# Patient Record
Sex: MALE | Race: BLACK OR AFRICAN AMERICAN | Employment: FULL TIME | URBAN - METROPOLITAN AREA
[De-identification: names, ages, dates, MRNs, and addresses within clinical notes are randomized per-mention and may not be internally consistent; named-entity substitution may affect disease eponyms.]

---

## 2017-11-22 ENCOUNTER — OFFICE VISIT (OUTPATIENT)
Dept: PRIMARY CARE CLINIC | Age: 37
End: 2017-11-22

## 2017-11-22 VITALS
DIASTOLIC BLOOD PRESSURE: 86 MMHG | SYSTOLIC BLOOD PRESSURE: 136 MMHG | WEIGHT: 220 LBS | TEMPERATURE: 98.4 F | BODY MASS INDEX: 35.36 KG/M2 | RESPIRATION RATE: 16 BRPM | HEART RATE: 74 BPM | OXYGEN SATURATION: 97 % | HEIGHT: 66 IN

## 2017-11-22 DIAGNOSIS — E66.09 CLASS 2 OBESITY DUE TO EXCESS CALORIES WITH BODY MASS INDEX (BMI) OF 35.0 TO 35.9 IN ADULT, UNSPECIFIED WHETHER SERIOUS COMORBIDITY PRESENT: ICD-10-CM

## 2017-11-22 DIAGNOSIS — G89.29 CHRONIC MIDLINE LOW BACK PAIN WITHOUT SCIATICA: ICD-10-CM

## 2017-11-22 DIAGNOSIS — Z82.3 FAMILY HISTORY OF STROKE: ICD-10-CM

## 2017-11-22 DIAGNOSIS — I10 ESSENTIAL HYPERTENSION: Primary | ICD-10-CM

## 2017-11-22 DIAGNOSIS — Z82.49 FAMILY HISTORY OF HYPERTENSION: ICD-10-CM

## 2017-11-22 DIAGNOSIS — M54.50 CHRONIC MIDLINE LOW BACK PAIN WITHOUT SCIATICA: ICD-10-CM

## 2017-11-22 PROCEDURE — 99204 OFFICE O/P NEW MOD 45 MIN: CPT | Performed by: FAMILY MEDICINE

## 2017-11-22 PROCEDURE — G8427 DOCREV CUR MEDS BY ELIG CLIN: HCPCS | Performed by: FAMILY MEDICINE

## 2017-11-22 PROCEDURE — G8482 FLU IMMUNIZE ORDER/ADMIN: HCPCS | Performed by: FAMILY MEDICINE

## 2017-11-22 PROCEDURE — G8417 CALC BMI ABV UP PARAM F/U: HCPCS | Performed by: FAMILY MEDICINE

## 2017-11-22 PROCEDURE — 4004F PT TOBACCO SCREEN RCVD TLK: CPT | Performed by: FAMILY MEDICINE

## 2017-11-22 RX ORDER — CYCLOBENZAPRINE HCL 10 MG
10 TABLET ORAL 3 TIMES DAILY PRN
Qty: 50 TABLET | Refills: 0 | Status: SHIPPED | OUTPATIENT
Start: 2017-11-22 | End: 2017-12-02

## 2017-11-22 RX ORDER — DICLOFENAC SODIUM 75 MG/1
75 TABLET, DELAYED RELEASE ORAL 2 TIMES DAILY
Qty: 60 TABLET | Refills: 1 | Status: SHIPPED | OUTPATIENT
Start: 2017-11-22 | End: 2017-12-13 | Stop reason: ALTCHOICE

## 2017-11-22 RX ORDER — AMLODIPINE BESYLATE 5 MG/1
5 TABLET ORAL EVERY MORNING
Qty: 30 TABLET | Refills: 3 | Status: SHIPPED | OUTPATIENT
Start: 2017-11-22 | End: 2017-12-13 | Stop reason: DRUGHIGH

## 2017-11-22 RX ORDER — PREDNISONE 10 MG/1
TABLET ORAL
Qty: 30 TABLET | Refills: 0 | Status: SHIPPED | OUTPATIENT
Start: 2017-11-22 | End: 2017-12-02

## 2017-11-22 ASSESSMENT — PATIENT HEALTH QUESTIONNAIRE - PHQ9
SUM OF ALL RESPONSES TO PHQ QUESTIONS 1-9: 0
1. LITTLE INTEREST OR PLEASURE IN DOING THINGS: 0
2. FEELING DOWN, DEPRESSED OR HOPELESS: 0
SUM OF ALL RESPONSES TO PHQ9 QUESTIONS 1 & 2: 0

## 2017-11-22 ASSESSMENT — ENCOUNTER SYMPTOMS
ABDOMINAL PAIN: 0
SHORTNESS OF BREATH: 0
BOWEL INCONTINENCE: 0
BLURRED VISION: 0
BACK PAIN: 1
ORTHOPNEA: 0

## 2017-11-22 NOTE — PROGRESS NOTES
States that his back flares up every 4 months or so. History reviewed. No pertinent past medical history. Past Surgical History:   Procedure Laterality Date    SMALL INTESTINE SURGERY      STOMACH SURGERY       Family History   Problem Relation Age of Onset    High Blood Pressure Mother     High Blood Pressure Father      Social History     Social History    Marital status:      Spouse name: N/A    Number of children: N/A    Years of education: N/A     Occupational History    Not on file. Social History Main Topics    Smoking status: Current Some Day Smoker     Years: 20.00    Smokeless tobacco: Never Used    Alcohol use Yes      Comment: ocasionally    Drug use: No    Sexual activity: Yes     Partners: Female     Other Topics Concern    Not on file     Social History Narrative    No narrative on file     Allergies:  Review of patient's allergies indicates no known allergies. Review of Systems   Constitutional: Negative for fever, malaise/fatigue and weight loss. Eyes: Negative for blurred vision. Respiratory: Negative for shortness of breath. Cardiovascular: Negative for chest pain, palpitations, orthopnea and PND. Gastrointestinal: Negative for abdominal pain and bowel incontinence. Genitourinary: Positive for pelvic pain (bilateral hips). Negative for bladder incontinence and dysuria. Musculoskeletal: Positive for back pain. Negative for neck pain. Neurological: Negative for tingling, weakness, numbness, headaches and paresthesias. Objective:   /86   Pulse 74   Temp 98.4 °F (36.9 °C) (Tympanic)   Resp 16   Ht 5' 6\" (1.676 m)   Wt 220 lb (99.8 kg)   SpO2 97%   BMI 35.51 kg/m²     Physical Exam   Musculoskeletal:        Lumbar back: He exhibits decreased range of motion, tenderness, pain and spasm. PHYSICAL EXAMINATION:   VITAL SIGNS: are as recorded. GENERAL:  The patient appears well nourished and well-developed, and with normal affect. No acute respiratory distress. Alert and oriented times 3. No skin rashes. HEENT:  TMs normal bilaterally. Canals and ears normal. Pharynx is clear. Extraocular eye motions intact and pain free. Pupils reactive and equally round. Sclerae and conjunctivae clear, normocephalic and atraumatic. RESPIRATORY:  Clear and equal breath sounds with no acute respiratory distress. HEART: Regular rhythm without murmur, rub or gallop. ABDOMEN:  soft, nontender. No masses, guarding or rebound. Normoactive bowel sounds. LOW BACK: tenderness to palpation of inferior lumbar spine or either sacroiliac joint area. Assessment:     1. Essential hypertension  amLODIPine (NORVASC) 5 MG tablet   2. Chronic midline low back pain without sciatica  diclofenac (VOLTAREN) 75 MG EC tablet    cyclobenzaprine (FLEXERIL) 10 MG tablet    predniSONE (DELTASONE) 10 MG tablet   3. Family history of hypertension  amLODIPine (NORVASC) 5 MG tablet   4. Family history of stroke  amLODIPine (NORVASC) 5 MG tablet   5. Class 2 obesity due to excess calories with body mass index (BMI) of 35.0 to 35.9 in adult, unspecified whether serious comorbidity present         Plan:      No orders of the defined types were placed in this encounter.     Orders Placed This Encounter   Medications    diclofenac (VOLTAREN) 75 MG EC tablet     Sig: Take 1 tablet by mouth 2 times daily     Dispense:  60 tablet     Refill:  1    cyclobenzaprine (FLEXERIL) 10 MG tablet     Sig: Take 1 tablet by mouth 3 times daily as needed for Muscle spasms     Dispense:  50 tablet     Refill:  0    predniSONE (DELTASONE) 10 MG tablet     Si per day for 3 days, 3 per day for 3 days, then 2 per day for 3 days, then1 per day for 3 days, then stop     Dispense:  30 tablet     Refill:  0    amLODIPine (NORVASC) 5 MG tablet     Sig: Take 1 tablet by mouth every morning     Dispense:  30 tablet     Refill:  3     PT & ice    Controlled Substances Monitoring:

## 2017-12-13 ENCOUNTER — OFFICE VISIT (OUTPATIENT)
Dept: PRIMARY CARE CLINIC | Age: 37
End: 2017-12-13

## 2017-12-13 VITALS
HEART RATE: 80 BPM | WEIGHT: 221 LBS | SYSTOLIC BLOOD PRESSURE: 150 MMHG | DIASTOLIC BLOOD PRESSURE: 96 MMHG | HEIGHT: 66 IN | TEMPERATURE: 97.4 F | RESPIRATION RATE: 12 BRPM | BODY MASS INDEX: 35.52 KG/M2

## 2017-12-13 DIAGNOSIS — I10 ESSENTIAL HYPERTENSION: Primary | ICD-10-CM

## 2017-12-13 DIAGNOSIS — G89.29 CHRONIC MIDLINE LOW BACK PAIN WITHOUT SCIATICA: ICD-10-CM

## 2017-12-13 DIAGNOSIS — E66.09 CLASS 2 OBESITY DUE TO EXCESS CALORIES WITH BODY MASS INDEX (BMI) OF 35.0 TO 35.9 IN ADULT, UNSPECIFIED WHETHER SERIOUS COMORBIDITY PRESENT: ICD-10-CM

## 2017-12-13 DIAGNOSIS — M54.50 CHRONIC MIDLINE LOW BACK PAIN WITHOUT SCIATICA: ICD-10-CM

## 2017-12-13 DIAGNOSIS — Z82.49 FAMILY HISTORY OF HYPERTENSION: ICD-10-CM

## 2017-12-13 DIAGNOSIS — Z82.3 FAMILY HISTORY OF STROKE: ICD-10-CM

## 2017-12-13 DIAGNOSIS — Z72.0 TOBACCO ABUSE: ICD-10-CM

## 2017-12-13 PROCEDURE — 4004F PT TOBACCO SCREEN RCVD TLK: CPT | Performed by: FAMILY MEDICINE

## 2017-12-13 PROCEDURE — G8482 FLU IMMUNIZE ORDER/ADMIN: HCPCS | Performed by: FAMILY MEDICINE

## 2017-12-13 PROCEDURE — 99214 OFFICE O/P EST MOD 30 MIN: CPT | Performed by: FAMILY MEDICINE

## 2017-12-13 PROCEDURE — G8427 DOCREV CUR MEDS BY ELIG CLIN: HCPCS | Performed by: FAMILY MEDICINE

## 2017-12-13 PROCEDURE — G8417 CALC BMI ABV UP PARAM F/U: HCPCS | Performed by: FAMILY MEDICINE

## 2017-12-13 RX ORDER — AMLODIPINE BESYLATE 10 MG/1
10 TABLET ORAL DAILY
Qty: 30 TABLET | Refills: 3 | Status: SHIPPED | OUTPATIENT
Start: 2017-12-13 | End: 2018-09-26 | Stop reason: SDUPTHER

## 2017-12-13 RX ORDER — BUPROPION HYDROCHLORIDE 150 MG/1
150 TABLET, EXTENDED RELEASE ORAL 2 TIMES DAILY
Qty: 60 TABLET | Refills: 3 | Status: SHIPPED | OUTPATIENT
Start: 2017-12-13

## 2017-12-13 RX ORDER — IBUPROFEN 800 MG/1
800 TABLET ORAL EVERY 8 HOURS PRN
Qty: 90 TABLET | Refills: 3 | Status: SHIPPED | OUTPATIENT
Start: 2017-12-13 | End: 2018-08-29

## 2017-12-13 ASSESSMENT — ENCOUNTER SYMPTOMS
BACK PAIN: 1
BOWEL INCONTINENCE: 0
ABDOMINAL PAIN: 0
ORTHOPNEA: 0
BLURRED VISION: 0
SHORTNESS OF BREATH: 0

## 2017-12-13 NOTE — PROGRESS NOTES
Subjective:      Patient ID: Zulema Gong is a 44 y.o. male who presents today for:  Chief Complaint   Patient presents with    Hypertension     Pt. is here for a f/u on hypertension. Pt. is currently taking Amlodipine which he states is working well for him. Pt. denies any associated symptoms.  Back Pain     Pt. is here for a f/u on back pain. Pt. states the pain is off and on. Pt. states the pain is sharp and stabbing. Pt. rates the pain as 6/10. Pt. has taken Ibuprofen 800 MG which took the pain away for awhile, also Diclofenac which he states didn't work when he needed it to. Hypertension   This is a chronic problem. The current episode started more than 1 year ago. The problem is uncontrolled. Pertinent negatives include no anxiety, blurred vision, chest pain, headaches, malaise/fatigue, neck pain, orthopnea, palpitations, peripheral edema, PND, shortness of breath or sweats. Risk factors for coronary artery disease include male gender. Treatments tried: amlodipine 5 MG. The current treatment provides mild improvement. Back Pain   This is a recurrent problem. The current episode started more than 1 month ago. The problem occurs intermittently. The pain is present in the lumbar spine. The quality of the pain is described as stabbing (sharp). Radiates to: bilateral hips. The pain is at a severity of 6/10. The pain is moderate. The symptoms are aggravated by position, bending and twisting. Pertinent negatives include no abdominal pain, bladder incontinence, bowel incontinence, chest pain, dysuria, fever, headaches, leg pain, numbness, paresis, paresthesias, pelvic pain, perianal numbness, tingling, weakness or weight loss. Treatments tried: ibuprofen, diclofenac, prednisone. The treatment provided mild relief. States that he feels like his back is 50% better. He was advised to d/c the diclofenac & take ibuprofen instead. No past medical history on file.   Past Surgical History:   Procedure Laterality Date    SMALL INTESTINE SURGERY      STOMACH SURGERY       Family History   Problem Relation Age of Onset    High Blood Pressure Mother     High Blood Pressure Father      Social History     Social History    Marital status:      Spouse name: N/A    Number of children: N/A    Years of education: N/A     Occupational History    Not on file. Social History Main Topics    Smoking status: Current Some Day Smoker     Years: 20.00    Smokeless tobacco: Never Used    Alcohol use Yes      Comment: ocasionally    Drug use: No    Sexual activity: Yes     Partners: Female     Other Topics Concern    Not on file     Social History Narrative    No narrative on file     Allergies:  Review of patient's allergies indicates no known allergies. Review of Systems   Constitutional: Negative for fever, malaise/fatigue and weight loss. Eyes: Negative for blurred vision. Respiratory: Negative for shortness of breath. Cardiovascular: Negative for chest pain, palpitations, orthopnea and PND. Gastrointestinal: Negative for abdominal pain and bowel incontinence. Genitourinary: Negative for bladder incontinence, dysuria and pelvic pain. Musculoskeletal: Positive for back pain. Negative for neck pain. Neurological: Negative for tingling, weakness, numbness, headaches and paresthesias. Objective:   BP (!) 150/96 (Site: Right Arm, Position: Sitting, Cuff Size: Large Adult)   Pulse 80   Temp 97.4 °F (36.3 °C) (Oral)   Resp 12   Ht 5' 6\" (1.676 m)   Wt 221 lb (100.2 kg)   BMI 35.67 kg/m²     Physical Exam      PHYSICAL EXAMINATION:   VITAL SIGNS: are as recorded. GENERAL:  The patient appears well nourished and well-developed, and with normal affect. No acute respiratory distress. Alert and oriented times 3. No skin rashes. HEENT:  TMs normal bilaterally. Canals and ears normal. Pharynx is clear. Extraocular eye motions intact and pain free.    Pupils reactive and equally round.  Sclerae and conjunctivae clear, normocephalic and atraumatic. RESPIRATORY:  Clear and equal breath sounds with no acute respiratory distress. HEART: Regular rhythm without murmur, rub or gallop. ABDOMEN:  soft, nontender. No masses, guarding or rebound. Normoactive bowel sounds. LOW BACK:  tenderness to palpation of inferior lumbar spine or either sacroiliac joint area. Assessment:     1. Essential hypertension  amLODIPine (NORVASC) 10 MG tablet   2. Chronic midline low back pain without sciatica  ibuprofen (ADVIL;MOTRIN) 800 MG tablet   3. Family history of hypertension     4. Family history of stroke     5. Class 2 obesity due to excess calories with body mass index (BMI) of 35.0 to 35.9 in adult, unspecified whether serious comorbidity present     6. Tobacco abuse  buPROPion (WELLBUTRIN SR) 150 MG extended release tablet       Plan:      No orders of the defined types were placed in this encounter. Orders Placed This Encounter   Medications    amLODIPine (NORVASC) 10 MG tablet     Sig: Take 1 tablet by mouth daily     Dispense:  30 tablet     Refill:  3    ibuprofen (ADVIL;MOTRIN) 800 MG tablet     Sig: Take 1 tablet by mouth every 8 hours as needed for Pain     Dispense:  90 tablet     Refill:  3    buPROPion (WELLBUTRIN SR) 150 MG extended release tablet     Sig: Take 1 tablet by mouth 2 times daily     Dispense:  60 tablet     Refill:  3     bp next    D/c smoking    bw next & u/a & EKG next    Controlled Substances Monitoring:      Return in about 3 weeks (around 1/3/2018). I, LUH Acosta  , am scribing for and in the presence of Rakan Burton MD. Electronically signed by :  Daren Acosta MD, personally performed the services described in this documentation, as scribed by . LUH Acosta   in my presence, and it is both accurate and complete.  Electronically signed by: Rakan Burton MD    12/14/17 6:31 AM    Hedy Lay Art Rae MD

## 2018-08-22 ENCOUNTER — OFFICE VISIT (OUTPATIENT)
Dept: PRIMARY CARE CLINIC | Age: 38
End: 2018-08-22
Payer: COMMERCIAL

## 2018-08-22 VITALS
SYSTOLIC BLOOD PRESSURE: 132 MMHG | HEART RATE: 76 BPM | WEIGHT: 218 LBS | DIASTOLIC BLOOD PRESSURE: 70 MMHG | RESPIRATION RATE: 14 BRPM | OXYGEN SATURATION: 98 % | BODY MASS INDEX: 35.03 KG/M2 | TEMPERATURE: 98 F | HEIGHT: 66 IN

## 2018-08-22 DIAGNOSIS — E66.09 CLASS 2 OBESITY DUE TO EXCESS CALORIES WITH BODY MASS INDEX (BMI) OF 35.0 TO 35.9 IN ADULT, UNSPECIFIED WHETHER SERIOUS COMORBIDITY PRESENT: ICD-10-CM

## 2018-08-22 DIAGNOSIS — G89.29 CHRONIC MIDLINE LOW BACK PAIN WITHOUT SCIATICA: ICD-10-CM

## 2018-08-22 DIAGNOSIS — M54.50 CHRONIC MIDLINE LOW BACK PAIN WITHOUT SCIATICA: ICD-10-CM

## 2018-08-22 DIAGNOSIS — I10 ESSENTIAL HYPERTENSION: Primary | ICD-10-CM

## 2018-08-22 PROCEDURE — 99214 OFFICE O/P EST MOD 30 MIN: CPT | Performed by: FAMILY MEDICINE

## 2018-08-22 PROCEDURE — G8427 DOCREV CUR MEDS BY ELIG CLIN: HCPCS | Performed by: FAMILY MEDICINE

## 2018-08-22 PROCEDURE — 4004F PT TOBACCO SCREEN RCVD TLK: CPT | Performed by: FAMILY MEDICINE

## 2018-08-22 PROCEDURE — G8417 CALC BMI ABV UP PARAM F/U: HCPCS | Performed by: FAMILY MEDICINE

## 2018-08-22 RX ORDER — HYDROCODONE BITARTRATE AND ACETAMINOPHEN 5; 325 MG/1; MG/1
1 TABLET ORAL EVERY 6 HOURS PRN
Qty: 28 TABLET | Refills: 0 | Status: SHIPPED | OUTPATIENT
Start: 2018-08-22 | End: 2018-08-29

## 2018-08-22 RX ORDER — PREDNISONE 10 MG/1
TABLET ORAL
Qty: 30 TABLET | Refills: 0 | Status: SHIPPED | OUTPATIENT
Start: 2018-08-22 | End: 2018-08-29 | Stop reason: SDUPTHER

## 2018-08-22 RX ORDER — DICLOFENAC SODIUM 75 MG/1
75 TABLET, DELAYED RELEASE ORAL 2 TIMES DAILY
Qty: 60 TABLET | Refills: 2 | Status: SHIPPED | OUTPATIENT
Start: 2018-08-22

## 2018-08-22 NOTE — PROGRESS NOTES
Taylor Cowan 43 y.o. male presents today with   Chief Complaint   Patient presents with    Back Pain     Pt. is here today for reoccuring LL back pain x4 days. He states the pain in a stabbing sharp pain 10/10 pain rate. Ibuprofen 800mg with little relief. HPI     Fu back     Chronic,worse    Fu htn ,wt    Chronic,stable    No past medical history on file. Patient Active Problem List    Diagnosis Date Noted    Essential hypertension 11/22/2017    Chronic midline low back pain without sciatica 11/22/2017    Family history of hypertension 11/22/2017    Family history of stroke 11/22/2017    Class 2 obesity due to excess calories with body mass index (BMI) of 35.0 to 35.9 in adult 11/22/2017     Past Surgical History:   Procedure Laterality Date    SMALL INTESTINE SURGERY      STOMACH SURGERY       Family History   Problem Relation Age of Onset    High Blood Pressure Mother     High Blood Pressure Father      Social History     Social History    Marital status:      Spouse name: N/A    Number of children: N/A    Years of education: N/A     Social History Main Topics    Smoking status: Current Some Day Smoker     Years: 20.00    Smokeless tobacco: Never Used    Alcohol use Yes      Comment: ocasionally    Drug use: No    Sexual activity: Yes     Partners: Female     Other Topics Concern    None     Social History Narrative    None     No Known Allergies    Review of Systems        Vitals:    08/22/18 1754   BP: 132/70   Site: Right Arm   Position: Sitting   Cuff Size: Large Adult   Pulse: 76   Resp: 14   Temp: 98 °F (36.7 °C)   TempSrc: Oral   SpO2: 98%   Weight: 218 lb (98.9 kg)   Height: 5' 6\" (1.676 m)       Physical Exam       PHYSICAL EXAMINATION:   VITAL SIGNS: are as recorded. GENERAL:  The patient appears well nourished and well-developed, and with normal affect. No acute respiratory distress. Alert and oriented times 3. No skin rashes. HEENT:  TMs normal bilaterally.

## 2018-08-29 ENCOUNTER — OFFICE VISIT (OUTPATIENT)
Dept: PRIMARY CARE CLINIC | Age: 38
End: 2018-08-29
Payer: COMMERCIAL

## 2018-08-29 VITALS
SYSTOLIC BLOOD PRESSURE: 132 MMHG | HEIGHT: 66 IN | WEIGHT: 214 LBS | RESPIRATION RATE: 14 BRPM | TEMPERATURE: 98.8 F | DIASTOLIC BLOOD PRESSURE: 82 MMHG | HEART RATE: 78 BPM | BODY MASS INDEX: 34.39 KG/M2 | OXYGEN SATURATION: 98 %

## 2018-08-29 DIAGNOSIS — G89.29 CHRONIC MIDLINE LOW BACK PAIN WITHOUT SCIATICA: ICD-10-CM

## 2018-08-29 DIAGNOSIS — M62.830 BACK SPASM: ICD-10-CM

## 2018-08-29 DIAGNOSIS — M54.50 CHRONIC MIDLINE LOW BACK PAIN WITHOUT SCIATICA: ICD-10-CM

## 2018-08-29 DIAGNOSIS — I10 ESSENTIAL HYPERTENSION: Primary | ICD-10-CM

## 2018-08-29 PROCEDURE — G8417 CALC BMI ABV UP PARAM F/U: HCPCS | Performed by: FAMILY MEDICINE

## 2018-08-29 PROCEDURE — 4004F PT TOBACCO SCREEN RCVD TLK: CPT | Performed by: FAMILY MEDICINE

## 2018-08-29 PROCEDURE — G8427 DOCREV CUR MEDS BY ELIG CLIN: HCPCS | Performed by: FAMILY MEDICINE

## 2018-08-29 PROCEDURE — 99214 OFFICE O/P EST MOD 30 MIN: CPT | Performed by: FAMILY MEDICINE

## 2018-08-29 RX ORDER — HYDROCODONE BITARTRATE AND ACETAMINOPHEN 5; 325 MG/1; MG/1
1 TABLET ORAL EVERY 6 HOURS PRN
Qty: 28 TABLET | Refills: 0 | Status: CANCELLED | OUTPATIENT
Start: 2018-08-29 | End: 2018-09-05

## 2018-08-29 RX ORDER — PREDNISONE 10 MG/1
TABLET ORAL
Qty: 30 TABLET | Refills: 0 | Status: SHIPPED | OUTPATIENT
Start: 2018-08-29 | End: 2018-09-12

## 2018-08-29 RX ORDER — GABAPENTIN 100 MG/1
CAPSULE ORAL
Qty: 60 CAPSULE | Refills: 0 | Status: SHIPPED | OUTPATIENT
Start: 2018-08-29 | End: 2018-09-12

## 2018-08-29 RX ORDER — OXYCODONE HYDROCHLORIDE AND ACETAMINOPHEN 5; 325 MG/1; MG/1
1 TABLET ORAL EVERY 6 HOURS PRN
Qty: 28 TABLET | Refills: 0 | Status: SHIPPED | OUTPATIENT
Start: 2018-08-29 | End: 2018-09-12 | Stop reason: SDUPTHER

## 2018-08-29 NOTE — PROGRESS NOTES
PPSV23) 03/28/1998    Potassium monitoring  07/21/2017    Creatinine monitoring  07/21/2017       Controlled Substances Monitoring: Attestation: The Prescription Monitoring Report for this patient was reviewed today. Ayde Kc MD)  Documentation: Possible medication side effects, risk of tolerance/dependence & alternative treatments discussed., Obtaining appropriate analgesic effect of treatment., No signs of potential drug abuse or diversion identified. Ayde Kc MD)    Return in about 2 weeks (around 9/12/2018).     Ayde Kc MD

## 2018-08-30 ASSESSMENT — ENCOUNTER SYMPTOMS
CHEST TIGHTNESS: 0
ABDOMINAL PAIN: 0
APNEA: 0
BACK PAIN: 1
ABDOMINAL DISTENTION: 0

## 2018-09-12 ENCOUNTER — OFFICE VISIT (OUTPATIENT)
Dept: PRIMARY CARE CLINIC | Age: 38
End: 2018-09-12
Payer: COMMERCIAL

## 2018-09-12 VITALS
DIASTOLIC BLOOD PRESSURE: 88 MMHG | SYSTOLIC BLOOD PRESSURE: 138 MMHG | HEART RATE: 74 BPM | WEIGHT: 218 LBS | TEMPERATURE: 99.2 F | RESPIRATION RATE: 14 BRPM | HEIGHT: 66 IN | BODY MASS INDEX: 35.03 KG/M2 | OXYGEN SATURATION: 94 %

## 2018-09-12 DIAGNOSIS — I10 ESSENTIAL HYPERTENSION: ICD-10-CM

## 2018-09-12 DIAGNOSIS — G89.29 CHRONIC MIDLINE LOW BACK PAIN WITHOUT SCIATICA: Primary | ICD-10-CM

## 2018-09-12 DIAGNOSIS — G89.29 CHRONIC MIDLINE LOW BACK PAIN WITHOUT SCIATICA: ICD-10-CM

## 2018-09-12 DIAGNOSIS — M54.2 NECK PAIN: ICD-10-CM

## 2018-09-12 DIAGNOSIS — M54.50 CHRONIC MIDLINE LOW BACK PAIN WITHOUT SCIATICA: Primary | ICD-10-CM

## 2018-09-12 DIAGNOSIS — M47.26 OSTEOARTHRITIS OF SPINE WITH RADICULOPATHY, LUMBAR REGION: ICD-10-CM

## 2018-09-12 DIAGNOSIS — M54.50 CHRONIC MIDLINE LOW BACK PAIN WITHOUT SCIATICA: ICD-10-CM

## 2018-09-12 DIAGNOSIS — M47.812 SPONDYLOSIS OF CERVICAL REGION WITHOUT MYELOPATHY OR RADICULOPATHY: ICD-10-CM

## 2018-09-12 PROCEDURE — G8417 CALC BMI ABV UP PARAM F/U: HCPCS | Performed by: FAMILY MEDICINE

## 2018-09-12 PROCEDURE — 4004F PT TOBACCO SCREEN RCVD TLK: CPT | Performed by: FAMILY MEDICINE

## 2018-09-12 PROCEDURE — 99214 OFFICE O/P EST MOD 30 MIN: CPT | Performed by: FAMILY MEDICINE

## 2018-09-12 PROCEDURE — G8427 DOCREV CUR MEDS BY ELIG CLIN: HCPCS | Performed by: FAMILY MEDICINE

## 2018-09-12 RX ORDER — PREDNISONE 10 MG/1
TABLET ORAL
Qty: 30 TABLET | Refills: 0 | Status: CANCELLED | OUTPATIENT
Start: 2018-09-12

## 2018-09-12 RX ORDER — OXYCODONE HYDROCHLORIDE AND ACETAMINOPHEN 5; 325 MG/1; MG/1
1 TABLET ORAL EVERY 6 HOURS PRN
Qty: 28 TABLET | Refills: 0 | Status: SHIPPED | OUTPATIENT
Start: 2018-09-12 | End: 2018-09-19

## 2018-09-12 ASSESSMENT — ENCOUNTER SYMPTOMS
BOWEL INCONTINENCE: 0
ABDOMINAL PAIN: 0
VISUAL CHANGE: 0
WHEEZING: 0
TROUBLE SWALLOWING: 0
PHOTOPHOBIA: 0
COUGH: 0
BACK PAIN: 1
SHORTNESS OF BREATH: 0

## 2018-09-12 ASSESSMENT — PATIENT HEALTH QUESTIONNAIRE - PHQ9
SUM OF ALL RESPONSES TO PHQ QUESTIONS 1-9: 0
2. FEELING DOWN, DEPRESSED OR HOPELESS: 0
SUM OF ALL RESPONSES TO PHQ9 QUESTIONS 1 & 2: 0
SUM OF ALL RESPONSES TO PHQ QUESTIONS 1-9: 0
1. LITTLE INTEREST OR PLEASURE IN DOING THINGS: 0

## 2018-09-12 NOTE — PROGRESS NOTES
Obtaining appropriate analgesic effect of treatment., No signs of potential drug abuse or diversion identified. Liu Persaud MD)    Return in about 2 weeks (around 9/26/2018). I, Cuauhtemoc ARVIZU, am scribing for and in the presence of Liu Persaud MD. Electronically signed by : Alma Rosa Keller MD, personally performed the services described in this documentation, as scribed by Cuauhtemoc ARVIZU   in my presence, and it is both accurate and complete.  Electronically signed by: Liu Persaud MD    9/17/18 6:08 AM    Liu Persaud MD

## 2018-09-15 LAB
6-ACETYLMORPHINE: NOT DETECTED
7-AMINOCLONAZEPAM: NOT DETECTED
ALPHA-OH-ALPRAZOLAM: NOT DETECTED
ALPRAZOLAM: NOT DETECTED
AMPHETAMINE: NOT DETECTED
BARBITURATES: NOT DETECTED
BENZOYLECGONINE: NOT DETECTED
BUPRENORPHINE: NOT DETECTED
CARISOPRODOL: NOT DETECTED
CLONAZEPAM: NOT DETECTED
CODEINE: NOT DETECTED
CREATININE URINE: 110.1 MG/DL (ref 20–400)
DIAZEPAM: NOT DETECTED
EER PAIN MGT DRUG PANEL, HIGH RES/EMIT U: NORMAL
ETHYL GLUCURONIDE: NOT DETECTED
FENTANYL: NOT DETECTED
HYDROCODONE: NOT DETECTED
HYDROMORPHONE: NOT DETECTED
LORAZEPAM: NOT DETECTED
MARIJUANA METABOLITE: PRESENT
MDA: NOT DETECTED
MDEA: NOT DETECTED
MDMA URINE: NOT DETECTED
MEPERIDINE: NOT DETECTED
METHADONE: NOT DETECTED
METHAMPHETAMINE: NOT DETECTED
METHYLPHENIDATE: NOT DETECTED
MIDAZOLAM: NOT DETECTED
MORPHINE: NOT DETECTED
NORBUPRENORPHINE, FREE: NOT DETECTED
NORDIAZEPAM: NOT DETECTED
NORFENTANYL: NOT DETECTED
NORHYDROCODONE, URINE: NOT DETECTED
NOROXYCODONE: NOT DETECTED
NOROXYMORPHONE, URINE: NOT DETECTED
OXAZEPAM: NOT DETECTED
OXYCODONE: NOT DETECTED
OXYMORPHONE: NOT DETECTED
PAIN MANAGEMENT DRUG PANEL: NORMAL
PCP: NOT DETECTED
PHENTERMINE: NOT DETECTED
PROPOXYPHENE: NOT DETECTED
TAPENTADOL, URINE: NOT DETECTED
TAPENTADOL-O-SULFATE, URINE: NOT DETECTED
TEMAZEPAM: NOT DETECTED
TRAMADOL: NOT DETECTED
ZOLPIDEM: NOT DETECTED

## 2018-09-26 ENCOUNTER — OFFICE VISIT (OUTPATIENT)
Dept: PRIMARY CARE CLINIC | Age: 38
End: 2018-09-26
Payer: COMMERCIAL

## 2018-09-26 VITALS
HEART RATE: 72 BPM | TEMPERATURE: 97.9 F | RESPIRATION RATE: 14 BRPM | OXYGEN SATURATION: 97 % | WEIGHT: 220.5 LBS | HEIGHT: 66 IN | DIASTOLIC BLOOD PRESSURE: 98 MMHG | SYSTOLIC BLOOD PRESSURE: 142 MMHG | BODY MASS INDEX: 35.44 KG/M2

## 2018-09-26 DIAGNOSIS — Z72.0 TOBACCO ABUSE: ICD-10-CM

## 2018-09-26 DIAGNOSIS — G89.29 CHRONIC MIDLINE LOW BACK PAIN WITHOUT SCIATICA: ICD-10-CM

## 2018-09-26 DIAGNOSIS — M54.50 CHRONIC MIDLINE LOW BACK PAIN WITHOUT SCIATICA: ICD-10-CM

## 2018-09-26 DIAGNOSIS — M47.16 OSTEOARTHRITIS OF LUMBAR SPINE WITH MYELOPATHY: ICD-10-CM

## 2018-09-26 DIAGNOSIS — I10 ESSENTIAL HYPERTENSION: Primary | ICD-10-CM

## 2018-09-26 PROCEDURE — G8417 CALC BMI ABV UP PARAM F/U: HCPCS | Performed by: FAMILY MEDICINE

## 2018-09-26 PROCEDURE — 4004F PT TOBACCO SCREEN RCVD TLK: CPT | Performed by: FAMILY MEDICINE

## 2018-09-26 PROCEDURE — 99214 OFFICE O/P EST MOD 30 MIN: CPT | Performed by: FAMILY MEDICINE

## 2018-09-26 PROCEDURE — G8427 DOCREV CUR MEDS BY ELIG CLIN: HCPCS | Performed by: FAMILY MEDICINE

## 2018-09-26 RX ORDER — PREDNISONE 10 MG/1
TABLET ORAL
Qty: 30 TABLET | Refills: 0 | Status: SHIPPED | OUTPATIENT
Start: 2018-09-26

## 2018-09-26 RX ORDER — AMLODIPINE BESYLATE 5 MG/1
5 TABLET ORAL DAILY
Qty: 30 TABLET | Refills: 2 | Status: SHIPPED | OUTPATIENT
Start: 2018-09-26

## 2018-09-26 ASSESSMENT — ENCOUNTER SYMPTOMS
EYE DISCHARGE: 0
VOMITING: 0
APNEA: 0
ABDOMINAL PAIN: 0
GASTROINTESTINAL NEGATIVE: 1
BLOOD IN STOOL: 0
PHOTOPHOBIA: 0
CONSTIPATION: 0
NAUSEA: 0
BACK PAIN: 1
CHEST TIGHTNESS: 0
SHORTNESS OF BREATH: 0
RESPIRATORY NEGATIVE: 1
EYES NEGATIVE: 1
COUGH: 0
DIARRHEA: 0

## 2018-09-26 NOTE — PROGRESS NOTES
Subjective:      Patient ID: Taylor Cowan is a 43 y.o. male who presents today for:  Chief Complaint   Patient presents with    Back Pain     Pt is here  for 2 week follow up on neck and back pain. he was taking Percocet for pain with icing. The pain is still bad, turning gives shooting pain down his left leg and at times his feet gives out and he collapses on the floor. Neck hurts more after sleeping, back is good upon the morning wake up. Work is difficult , the percocet does help. Back Pain   This is a recurrent problem. The current episode started more than 1 month ago. The problem has been gradually improving since onset. The quality of the pain is described as shooting and stabbing. The pain radiates to the left thigh and right thigh. The pain is moderate. The symptoms are aggravated by twisting. Pertinent negatives include no abdominal pain, chest pain, dysuria, fever, headaches or weakness. Treatments tried: Percocet with icing and back exercising. The treatment provided mild relief. Patient states he does a lot of lifting during work. Patient admits that he is cutting down on smoking. FU htn,tob abuse      Chronic,stable    No past medical history on file. Past Surgical History:   Procedure Laterality Date    SMALL INTESTINE SURGERY      STOMACH SURGERY       Family History   Problem Relation Age of Onset    High Blood Pressure Mother     High Blood Pressure Father      Social History     Social History    Marital status:      Spouse name: N/A    Number of children: N/A    Years of education: N/A     Occupational History    Not on file.      Social History Main Topics    Smoking status: Current Some Day Smoker     Years: 20.00    Smokeless tobacco: Never Used    Alcohol use Yes      Comment: ocasionally    Drug use: No    Sexual activity: Yes     Partners: Female     Other Topics Concern    Not on file     Social History Narrative    No narrative on file spine or either sacroiliac joint area. Assessment:      Diagnosis Orders   1. Essential hypertension  amLODIPine (NORVASC) 5 MG tablet   2. Osteoarthritis of lumbar spine with myelopathy  predniSONE (DELTASONE) 10 MG tablet   3. Tobacco abuse     4. Chronic midline low back pain without sciatica  predniSONE (DELTASONE) 10 MG tablet       Plan:      No orders of the defined types were placed in this encounter. Orders Placed This Encounter   Medications    amLODIPine (NORVASC) 5 MG tablet     Sig: Take 1 tablet by mouth daily     Dispense:  30 tablet     Refill:  2    predniSONE (DELTASONE) 10 MG tablet     Sig: Take 4 daily x 3 days, Take 3 tabs daily x 3 days, then 2 tabs daily x 3  days, then 1 tab daily x 3 days     Dispense:  30 tablet     Refill:  0     Drug screen next    D/C smoking    D/c marijuana    Controlled Substances Monitoring:      Return in about 3 weeks (around 10/17/2018). I, LUH Castillo, am scribing for and in the presence of Lora Pitts MD. Electronically signed by :  Jamaica Castillo MD, personally performed the services described in this documentation, as scribed by Joao Rodriguez in my presence, and it is both accurate and complete.  Electronically signed by: Lora Pitts MD    9/26/18 5:36 PM    Lora Pitts MD

## 2019-04-19 ENCOUNTER — TELEPHONE (OUTPATIENT)
Dept: ADMINISTRATIVE | Age: 39
End: 2019-04-19

## 2020-10-29 ENCOUNTER — APPOINTMENT (OUTPATIENT)
Dept: GENERAL RADIOLOGY | Age: 40
End: 2020-10-29
Payer: COMMERCIAL

## 2020-10-29 ENCOUNTER — HOSPITAL ENCOUNTER (EMERGENCY)
Age: 40
Discharge: HOME | End: 2020-10-29
Attending: EMERGENCY MEDICINE
Payer: COMMERCIAL

## 2020-10-29 VITALS
OXYGEN SATURATION: 99 % | HEIGHT: 66 IN | SYSTOLIC BLOOD PRESSURE: 137 MMHG | TEMPERATURE: 97.3 F | WEIGHT: 226 LBS | RESPIRATION RATE: 18 BRPM | BODY MASS INDEX: 36.32 KG/M2 | HEART RATE: 75 BPM | DIASTOLIC BLOOD PRESSURE: 82 MMHG

## 2020-10-29 LAB
ALBUMIN SERPL-MCNC: 4.7 G/DL (ref 3.5–4.6)
ALP BLD-CCNC: 97 U/L (ref 35–104)
ALT SERPL-CCNC: 48 U/L (ref 0–41)
ANION GAP SERPL CALCULATED.3IONS-SCNC: 9 MEQ/L (ref 9–15)
AST SERPL-CCNC: 29 U/L (ref 0–40)
BASOPHILS ABSOLUTE: 0.1 K/UL (ref 0–0.2)
BASOPHILS RELATIVE PERCENT: 1 %
BILIRUB SERPL-MCNC: 0.9 MG/DL (ref 0.2–0.7)
BUN BLDV-MCNC: 10 MG/DL (ref 6–20)
CALCIUM SERPL-MCNC: 10 MG/DL (ref 8.5–9.9)
CHLORIDE BLD-SCNC: 101 MEQ/L (ref 95–107)
CO2: 30 MEQ/L (ref 20–31)
CREAT SERPL-MCNC: 0.89 MG/DL (ref 0.7–1.2)
EKG ATRIAL RATE: 68 BPM
EKG P AXIS: 38 DEGREES
EKG P-R INTERVAL: 188 MS
EKG Q-T INTERVAL: 374 MS
EKG QRS DURATION: 94 MS
EKG QTC CALCULATION (BAZETT): 397 MS
EKG R AXIS: 32 DEGREES
EKG T AXIS: 2 DEGREES
EKG VENTRICULAR RATE: 68 BPM
EOSINOPHILS ABSOLUTE: 0.2 K/UL (ref 0–0.7)
EOSINOPHILS RELATIVE PERCENT: 2.7 %
GFR AFRICAN AMERICAN: >60
GFR NON-AFRICAN AMERICAN: >60
GLOBULIN: 2.4 G/DL (ref 2.3–3.5)
GLUCOSE BLD-MCNC: 96 MG/DL (ref 70–99)
HCT VFR BLD CALC: 50.6 % (ref 42–52)
HEMOGLOBIN: 16.7 G/DL (ref 14–18)
LYMPHOCYTES ABSOLUTE: 2.8 K/UL (ref 1–4.8)
LYMPHOCYTES RELATIVE PERCENT: 34.1 %
MAGNESIUM: 2.2 MG/DL (ref 1.7–2.4)
MCH RBC QN AUTO: 30.2 PG (ref 27–31.3)
MCHC RBC AUTO-ENTMCNC: 33 % (ref 33–37)
MCV RBC AUTO: 91.3 FL (ref 80–100)
MONOCYTES ABSOLUTE: 0.9 K/UL (ref 0.2–0.8)
MONOCYTES RELATIVE PERCENT: 11.3 %
NEUTROPHILS ABSOLUTE: 4.2 K/UL (ref 1.4–6.5)
NEUTROPHILS RELATIVE PERCENT: 50.9 %
PDW BLD-RTO: 14.3 % (ref 11.5–14.5)
PLATELET # BLD: 257 K/UL (ref 130–400)
POTASSIUM SERPL-SCNC: 4.5 MEQ/L (ref 3.4–4.9)
RBC # BLD: 5.54 M/UL (ref 4.7–6.1)
SODIUM BLD-SCNC: 140 MEQ/L (ref 135–144)
TOTAL PROTEIN: 7.1 G/DL (ref 6.3–8)
TROPONIN: <0.01 NG/ML (ref 0–0.01)
WBC # BLD: 8.3 K/UL (ref 4.8–10.8)

## 2020-10-29 PROCEDURE — 84484 ASSAY OF TROPONIN QUANT: CPT

## 2020-10-29 PROCEDURE — 96374 THER/PROPH/DIAG INJ IV PUSH: CPT

## 2020-10-29 PROCEDURE — 93005 ELECTROCARDIOGRAM TRACING: CPT | Performed by: EMERGENCY MEDICINE

## 2020-10-29 PROCEDURE — 36415 COLL VENOUS BLD VENIPUNCTURE: CPT

## 2020-10-29 PROCEDURE — 80053 COMPREHEN METABOLIC PANEL: CPT

## 2020-10-29 PROCEDURE — 83735 ASSAY OF MAGNESIUM: CPT

## 2020-10-29 PROCEDURE — 93010 ELECTROCARDIOGRAM REPORT: CPT | Performed by: INTERNAL MEDICINE

## 2020-10-29 PROCEDURE — 6360000002 HC RX W HCPCS: Performed by: EMERGENCY MEDICINE

## 2020-10-29 PROCEDURE — 71045 X-RAY EXAM CHEST 1 VIEW: CPT

## 2020-10-29 PROCEDURE — 99285 EMERGENCY DEPT VISIT HI MDM: CPT

## 2020-10-29 PROCEDURE — 85025 COMPLETE CBC W/AUTO DIFF WBC: CPT

## 2020-10-29 PROCEDURE — 2580000003 HC RX 258: Performed by: EMERGENCY MEDICINE

## 2020-10-29 RX ORDER — 0.9 % SODIUM CHLORIDE 0.9 %
1000 INTRAVENOUS SOLUTION INTRAVENOUS ONCE
Status: COMPLETED | OUTPATIENT
Start: 2020-10-29 | End: 2020-10-29

## 2020-10-29 RX ORDER — KETOROLAC TROMETHAMINE 30 MG/ML
30 INJECTION, SOLUTION INTRAMUSCULAR; INTRAVENOUS ONCE
Status: DISCONTINUED | OUTPATIENT
Start: 2020-10-29 | End: 2020-10-29

## 2020-10-29 RX ORDER — MORPHINE SULFATE 2 MG/ML
4 INJECTION, SOLUTION INTRAMUSCULAR; INTRAVENOUS
Status: DISCONTINUED | OUTPATIENT
Start: 2020-10-29 | End: 2020-10-29 | Stop reason: HOSPADM

## 2020-10-29 RX ORDER — TRAMADOL HYDROCHLORIDE 50 MG/1
50 TABLET ORAL EVERY 4 HOURS PRN
Qty: 18 TABLET | Refills: 0 | Status: SHIPPED | OUTPATIENT
Start: 2020-10-29 | End: 2020-11-01

## 2020-10-29 RX ORDER — OXYCODONE HYDROCHLORIDE AND ACETAMINOPHEN 5; 325 MG/1; MG/1
1 TABLET ORAL ONCE
Status: DISCONTINUED | OUTPATIENT
Start: 2020-10-29 | End: 2020-10-29

## 2020-10-29 RX ORDER — MELOXICAM 15 MG/1
15 TABLET ORAL DAILY PRN
Qty: 90 TABLET | Refills: 1 | Status: SHIPPED | OUTPATIENT
Start: 2020-10-29

## 2020-10-29 RX ORDER — ONDANSETRON 2 MG/ML
4 INJECTION INTRAMUSCULAR; INTRAVENOUS ONCE
Status: COMPLETED | OUTPATIENT
Start: 2020-10-29 | End: 2020-10-29

## 2020-10-29 RX ADMIN — ONDANSETRON 4 MG: 2 INJECTION INTRAMUSCULAR; INTRAVENOUS at 14:29

## 2020-10-29 RX ADMIN — SODIUM CHLORIDE 1000 ML: 9 INJECTION, SOLUTION INTRAVENOUS at 14:29

## 2020-10-29 RX ADMIN — MORPHINE SULFATE 4 MG: 2 INJECTION, SOLUTION INTRAMUSCULAR; INTRAVENOUS at 14:29

## 2020-10-29 ASSESSMENT — ENCOUNTER SYMPTOMS
ABDOMINAL PAIN: 0
COUGH: 0
SHORTNESS OF BREATH: 0
BACK PAIN: 0
NAUSEA: 0
VOMITING: 0
SORE THROAT: 0
DIARRHEA: 0

## 2020-10-29 ASSESSMENT — PAIN DESCRIPTION - LOCATION
LOCATION: ABDOMEN
LOCATION: CHEST

## 2020-10-29 ASSESSMENT — PAIN SCALES - GENERAL
PAINLEVEL_OUTOF10: 10

## 2020-10-29 ASSESSMENT — PAIN DESCRIPTION - PAIN TYPE: TYPE: ACUTE PAIN

## 2020-10-29 NOTE — ED PROVIDER NOTES
[address] [facility] ED  eMERGENCYdEPARTMENT eNCOUnter      Pt Name: Sharif Hoover  MRN: [id number]  [place] 1980  Date of evaluation: 10/29/2020  Orlando Noonan MD    CHIEF COMPLAINT           HPI  Sharif Hoover is a 38 y.o. male per chart review has no pmh presents to the ED with chest pain. Pt notes on Saturday he was fishing and his pole was about to fall and he dove out and landed on his L chest.  Pt notes moderate, constant, aching, nonradiating, L chest pain since the fall. Pt denies fever, n/v, dizziness, sob, diaphoresis, ab pain, dysuria, diarrhea. Pt states that touching his chest makes it worse. ROS  Review of Systems   Constitutional: Negative for activity change, chills and fever. HENT: Negative for ear pain and sore throat. Eyes: Negative for visual disturbance. Respiratory: Negative for cough and shortness of breath. Cardiovascular: Positive for chest pain. Negative for palpitations and leg swelling. Gastrointestinal: Negative for abdominal pain, diarrhea, nausea and vomiting. Genitourinary: Negative for dysuria. Musculoskeletal: Negative for back pain. Skin: Negative for rash. Neurological: Negative for dizziness and weakness. Except as noted above the remainder of the review of systems was reviewed and negative. PAST MEDICAL HISTORY   History reviewed. No pertinent past medical history.       SURGICAL HISTORY       Past Surgical History:   Procedure Laterality Date    SMALL INTESTINE SURGERY      STOMACH SURGERY           CURRENTMEDICATIONS       Previous Medications    AMLODIPINE (NORVASC) 5 MG TABLET    Take 1 tablet by mouth daily    BUPROPION (WELLBUTRIN SR) 150 MG EXTENDED RELEASE TABLET    Take 1 tablet by mouth 2 times daily    DICLOFENAC (VOLTAREN) 75 MG EC TABLET    Take 1 tablet by mouth 2 times daily    PREDNISONE (DELTASONE) 10 MG TABLET    Take 4 daily x 3 days, Take 3 tabs daily x 3 days, then 2 tabs daily x 3  days, then 1 tab Pupils are equal, round, and reactive to light. Neck:      Musculoskeletal: Normal range of motion and neck supple. Cardiovascular:      Rate and Rhythm: Normal rate and regular rhythm. Heart sounds: Normal heart sounds. Pulmonary:      Effort: Pulmonary effort is normal.      Breath sounds: Normal breath sounds. Comments: +Tenderness to palpation over L side of chest.  +Swelling. Palpation reproduces pain. Abdominal:      General: Bowel sounds are normal. There is no distension. Palpations: Abdomen is soft. Tenderness: There is no abdominal tenderness. Musculoskeletal: Normal range of motion. Skin:     General: Skin is warm and dry. Neurological:      Mental Status: He is alert and oriented to person, place, and time. Psychiatric:         Mood and Affect: Mood normal.           MDM  37 yo male presents to the ED with chest pain s/p fall. Pt is afebrile, hemodynamically stable. Pt given 1 L NS, IV morphine, IV zofran, IV toradol with moderate relief. EKG shows NSR with HR 68, normal axis, normal intervals, diffuse ST elevation in anterior and lateral leads. Given history of pt diving and hitting his chest, pt with inconsistent history for MI. Labs including troponin negative. Given constant chest pain since Saturday and negative troponin, unlikely ACS. Case discussed with Dr. Gucci Chávez and we both believed pt was stable to go home and f/u outpatient. Pt educated about chest pain. Suspect ST elevation is due to pericarditis vs benign early repol. Pt given thorough chest pain warning signs and will return if pain is worse. Pt given cp warning signs and will f/u with pcp. Pt understands plan. FINAL IMPRESSION      1.  Chest pain, unspecified type          DISPOSITION/PLAN   DISPOSITION Decision To Discharge 10/29/2020 03:03:00 PM        DISCHARGE MEDICATIONS:  [unfilled]         Arleen Aguirre MD(electronically signed)  Attending Emergency Physician Fermín Chapin MD  10/29/20 [id number]

## 2020-10-29 NOTE — ED TRIAGE NOTES
Pt to ER with c/o chest pain since sat or sun, pt states he was fishing and his pole starting to go in the water so he dove onto concrete to save his fishing pole and has had pain on the left side of his chest and into his armpit area since, pt states pain is 10/10, pt a&ox4, resp even and unlabored

## 2023-02-22 LAB
6-ACETYLMORPHINE: <25 NG/ML
7-AMINOCLONAZEPAM: <25 NG/ML
ALPHA-HYDROXYALPRAZOLAM: <25 NG/ML
ALPHA-HYDROXYMIDAZOLAM: <25 NG/ML
ALPRAZOLAM: <25 NG/ML
AMPHETAMINE (PRESENCE) IN URINE BY SCREEN METHOD: NORMAL
BARBITURATES PRESENCE IN URINE BY SCREEN METHOD: NORMAL
CANNABINOIDS IN URINE BY SCREEN METHOD: NORMAL
CHLORDIAZEPOXIDE: <25 NG/ML
CLONAZEPAM: <25 NG/ML
COCAINE (PRESENCE) IN URINE BY SCREEN METHOD: NORMAL
CODEINE: <50 NG/ML
CREATINE, URINE FOR DRUG: 165.5 MG/DL
DIAZEPAM: <25 NG/ML
DRUG SCREEN COMMENT URINE: NORMAL
EDDP: <25 NG/ML
FENTANYL CONFIRMATION, URINE: <2.5 NG/ML
HYDROCODONE: <25 NG/ML
HYDROMORPHONE: <25 NG/ML
LORAZEPAM: <25 NG/ML
METHADONE CONFIRMATION,URINE: <25 NG/ML
MIDAZOLAM: <25 NG/ML
MORPHINE URINE: <50 NG/ML
NORDIAZEPAM: <25 NG/ML
NORFENTANYL: <2.5 NG/ML
NORHYDROCODONE: <25 NG/ML
NOROXYCODONE: <25 NG/ML
O-DESMETHYLTRAMADOL: <50 NG/ML
OXAZEPAM: <25 NG/ML
OXYCODONE: <25 NG/ML
OXYMORPHONE: <25 NG/ML
PHENCYCLIDINE (PRESENCE) IN URINE BY SCREEN METHOD: NORMAL
TEMAZEPAM: <25 NG/ML
TRAMADOL: <50 NG/ML
ZOLPIDEM METABOLITE (ZCA): <25 NG/ML
ZOLPIDEM: <25 NG/ML

## 2023-03-04 DIAGNOSIS — M54.42 CHRONIC BILATERAL LOW BACK PAIN WITH BILATERAL SCIATICA: Primary | ICD-10-CM

## 2023-03-04 DIAGNOSIS — M54.41 CHRONIC BILATERAL LOW BACK PAIN WITH BILATERAL SCIATICA: Primary | ICD-10-CM

## 2023-03-04 DIAGNOSIS — G89.29 CHRONIC BILATERAL LOW BACK PAIN WITH BILATERAL SCIATICA: Primary | ICD-10-CM

## 2023-03-04 RX ORDER — OXYCODONE AND ACETAMINOPHEN 5; 325 MG/1; MG/1
1 TABLET ORAL EVERY 6 HOURS PRN
Qty: 30 TABLET | Refills: 0 | Status: SHIPPED | OUTPATIENT
Start: 2023-03-04 | End: 2023-03-05 | Stop reason: SDUPTHER

## 2023-03-05 DIAGNOSIS — M54.41 CHRONIC BILATERAL LOW BACK PAIN WITH BILATERAL SCIATICA: ICD-10-CM

## 2023-03-05 DIAGNOSIS — G89.29 CHRONIC BILATERAL LOW BACK PAIN WITH BILATERAL SCIATICA: ICD-10-CM

## 2023-03-05 DIAGNOSIS — M54.42 CHRONIC BILATERAL LOW BACK PAIN WITH BILATERAL SCIATICA: ICD-10-CM

## 2023-03-05 RX ORDER — OXYCODONE AND ACETAMINOPHEN 5; 325 MG/1; MG/1
1 TABLET ORAL EVERY 6 HOURS PRN
Qty: 30 TABLET | Refills: 0 | Status: CANCELLED | OUTPATIENT
Start: 2023-03-05 | End: 2023-03-12

## 2023-03-06 RX ORDER — OXYCODONE AND ACETAMINOPHEN 5; 325 MG/1; MG/1
1 TABLET ORAL EVERY 6 HOURS PRN
Qty: 30 TABLET | Refills: 0 | Status: SHIPPED | OUTPATIENT
Start: 2023-03-06 | End: 2023-03-13

## 2023-03-09 PROBLEM — M47.812 OSTEOARTHRITIS OF CERVICAL SPINE: Status: ACTIVE | Noted: 2023-03-09

## 2023-03-09 PROBLEM — E66.01 MORBID OBESITY WITH BMI OF 40.0-44.9, ADULT (MULTI): Status: ACTIVE | Noted: 2023-03-09

## 2023-03-09 PROBLEM — H65.90 SEROUS OTITIS MEDIA: Status: ACTIVE | Noted: 2023-03-09

## 2023-03-09 PROBLEM — M62.830 SPASM OF LUMBAR PARASPINOUS MUSCLE: Status: ACTIVE | Noted: 2023-03-09

## 2023-03-09 PROBLEM — M47.26 OSTEOARTHRITIS OF SPINE WITH RADICULOPATHY, LUMBAR REGION: Status: ACTIVE | Noted: 2023-03-09

## 2023-03-09 PROBLEM — I10 HTN (HYPERTENSION): Status: ACTIVE | Noted: 2023-03-09

## 2023-03-09 PROBLEM — N52.9 ERECTILE DYSFUNCTION: Status: ACTIVE | Noted: 2023-03-09

## 2023-03-10 RX ORDER — KETOROLAC TROMETHAMINE 10 MG/1
10 TABLET, FILM COATED ORAL 4 TIMES DAILY PRN
COMMUNITY
End: 2024-01-30 | Stop reason: SDUPTHER

## 2023-03-10 RX ORDER — HYDROCHLOROTHIAZIDE 25 MG/1
1 TABLET ORAL DAILY
COMMUNITY
Start: 2021-01-21 | End: 2024-01-30 | Stop reason: SDUPTHER

## 2023-03-10 RX ORDER — SILDENAFIL 100 MG/1
TABLET, FILM COATED ORAL DAILY PRN
COMMUNITY

## 2023-03-10 RX ORDER — MELOXICAM 15 MG/1
15 TABLET ORAL DAILY
COMMUNITY
End: 2024-01-30 | Stop reason: WASHOUT

## 2023-03-10 RX ORDER — LOSARTAN POTASSIUM 50 MG/1
50 TABLET ORAL DAILY
COMMUNITY
End: 2024-01-30 | Stop reason: SDUPTHER

## 2023-03-10 RX ORDER — AMLODIPINE BESYLATE 10 MG/1
1 TABLET ORAL DAILY
COMMUNITY
End: 2024-01-30 | Stop reason: SDUPTHER

## 2024-01-28 ENCOUNTER — APPOINTMENT (OUTPATIENT)
Dept: CARDIOLOGY | Facility: HOSPITAL | Age: 44
End: 2024-01-28
Payer: COMMERCIAL

## 2024-01-28 ENCOUNTER — APPOINTMENT (OUTPATIENT)
Dept: RADIOLOGY | Facility: HOSPITAL | Age: 44
End: 2024-01-28
Payer: COMMERCIAL

## 2024-01-28 ENCOUNTER — HOSPITAL ENCOUNTER (EMERGENCY)
Facility: HOSPITAL | Age: 44
Discharge: HOME | End: 2024-01-28
Attending: EMERGENCY MEDICINE
Payer: COMMERCIAL

## 2024-01-28 VITALS
BODY MASS INDEX: 42.38 KG/M2 | DIASTOLIC BLOOD PRESSURE: 101 MMHG | WEIGHT: 270 LBS | OXYGEN SATURATION: 99 % | SYSTOLIC BLOOD PRESSURE: 182 MMHG | RESPIRATION RATE: 18 BRPM | HEART RATE: 72 BPM | HEIGHT: 67 IN

## 2024-01-28 DIAGNOSIS — I10 HYPERTENSION, UNSPECIFIED TYPE: ICD-10-CM

## 2024-01-28 DIAGNOSIS — M54.50 ACUTE LOW BACK PAIN, UNSPECIFIED BACK PAIN LATERALITY, UNSPECIFIED WHETHER SCIATICA PRESENT: ICD-10-CM

## 2024-01-28 DIAGNOSIS — S39.012A LUMBAR STRAIN, INITIAL ENCOUNTER: Primary | ICD-10-CM

## 2024-01-28 DIAGNOSIS — F17.200 SMOKING: ICD-10-CM

## 2024-01-28 LAB
ANION GAP SERPL CALC-SCNC: 14 MMOL/L (ref 10–20)
BASOPHILS # BLD AUTO: 0.05 X10*3/UL (ref 0–0.1)
BASOPHILS NFR BLD AUTO: 0.8 %
BUN SERPL-MCNC: 14 MG/DL (ref 6–23)
CALCIUM SERPL-MCNC: 9.7 MG/DL (ref 8.6–10.3)
CARDIAC TROPONIN I PNL SERPL HS: 6 NG/L (ref 0–20)
CHLORIDE SERPL-SCNC: 103 MMOL/L (ref 98–107)
CO2 SERPL-SCNC: 26 MMOL/L (ref 21–32)
CREAT SERPL-MCNC: 0.96 MG/DL (ref 0.5–1.3)
EGFRCR SERPLBLD CKD-EPI 2021: >90 ML/MIN/1.73M*2
EOSINOPHIL # BLD AUTO: 0.22 X10*3/UL (ref 0–0.7)
EOSINOPHIL NFR BLD AUTO: 3.7 %
ERYTHROCYTE [DISTWIDTH] IN BLOOD BY AUTOMATED COUNT: 13.9 % (ref 11.5–14.5)
GLUCOSE SERPL-MCNC: 90 MG/DL (ref 74–99)
HCT VFR BLD AUTO: 50.8 % (ref 41–52)
HGB BLD-MCNC: 16.6 G/DL (ref 13.5–17.5)
IMM GRANULOCYTES # BLD AUTO: 0.03 X10*3/UL (ref 0–0.7)
IMM GRANULOCYTES NFR BLD AUTO: 0.5 % (ref 0–0.9)
LYMPHOCYTES # BLD AUTO: 2.22 X10*3/UL (ref 1.2–4.8)
LYMPHOCYTES NFR BLD AUTO: 37.1 %
MAGNESIUM SERPL-MCNC: 2.05 MG/DL (ref 1.6–2.4)
MCH RBC QN AUTO: 30.2 PG (ref 26–34)
MCHC RBC AUTO-ENTMCNC: 32.7 G/DL (ref 32–36)
MCV RBC AUTO: 92 FL (ref 80–100)
MONOCYTES # BLD AUTO: 0.74 X10*3/UL (ref 0.1–1)
MONOCYTES NFR BLD AUTO: 12.4 %
NEUTROPHILS # BLD AUTO: 2.73 X10*3/UL (ref 1.2–7.7)
NEUTROPHILS NFR BLD AUTO: 45.5 %
NRBC BLD-RTO: 0 /100 WBCS (ref 0–0)
PHOSPHATE SERPL-MCNC: 3.8 MG/DL (ref 2.5–4.9)
PLATELET # BLD AUTO: 266 X10*3/UL (ref 150–450)
POTASSIUM SERPL-SCNC: 4.3 MMOL/L (ref 3.5–5.3)
RBC # BLD AUTO: 5.5 X10*6/UL (ref 4.5–5.9)
SODIUM SERPL-SCNC: 139 MMOL/L (ref 136–145)
WBC # BLD AUTO: 6 X10*3/UL (ref 4.4–11.3)

## 2024-01-28 PROCEDURE — 2500000004 HC RX 250 GENERAL PHARMACY W/ HCPCS (ALT 636 FOR OP/ED): Performed by: EMERGENCY MEDICINE

## 2024-01-28 PROCEDURE — 72131 CT LUMBAR SPINE W/O DYE: CPT

## 2024-01-28 PROCEDURE — 84484 ASSAY OF TROPONIN QUANT: CPT | Performed by: EMERGENCY MEDICINE

## 2024-01-28 PROCEDURE — 85025 COMPLETE CBC W/AUTO DIFF WBC: CPT | Performed by: EMERGENCY MEDICINE

## 2024-01-28 PROCEDURE — 72131 CT LUMBAR SPINE W/O DYE: CPT | Performed by: RADIOLOGY

## 2024-01-28 PROCEDURE — 96375 TX/PRO/DX INJ NEW DRUG ADDON: CPT

## 2024-01-28 PROCEDURE — 99285 EMERGENCY DEPT VISIT HI MDM: CPT | Mod: 25

## 2024-01-28 PROCEDURE — 36415 COLL VENOUS BLD VENIPUNCTURE: CPT | Performed by: EMERGENCY MEDICINE

## 2024-01-28 PROCEDURE — 84100 ASSAY OF PHOSPHORUS: CPT | Performed by: EMERGENCY MEDICINE

## 2024-01-28 PROCEDURE — 80048 BASIC METABOLIC PNL TOTAL CA: CPT | Performed by: EMERGENCY MEDICINE

## 2024-01-28 PROCEDURE — 96361 HYDRATE IV INFUSION ADD-ON: CPT

## 2024-01-28 PROCEDURE — 93005 ELECTROCARDIOGRAM TRACING: CPT

## 2024-01-28 PROCEDURE — 96374 THER/PROPH/DIAG INJ IV PUSH: CPT

## 2024-01-28 PROCEDURE — 83735 ASSAY OF MAGNESIUM: CPT | Performed by: EMERGENCY MEDICINE

## 2024-01-28 RX ORDER — OXYCODONE AND ACETAMINOPHEN 5; 325 MG/1; MG/1
1 TABLET ORAL EVERY 6 HOURS PRN
Qty: 5 TABLET | Refills: 0 | Status: SHIPPED | OUTPATIENT
Start: 2024-01-28 | End: 2024-01-30 | Stop reason: ALTCHOICE

## 2024-01-28 RX ORDER — HYDROMORPHONE HYDROCHLORIDE 1 MG/ML
1 INJECTION, SOLUTION INTRAMUSCULAR; INTRAVENOUS; SUBCUTANEOUS ONCE
Status: COMPLETED | OUTPATIENT
Start: 2024-01-28 | End: 2024-01-28

## 2024-01-28 RX ORDER — LABETALOL HYDROCHLORIDE 5 MG/ML
20 INJECTION, SOLUTION INTRAVENOUS ONCE
Status: COMPLETED | OUTPATIENT
Start: 2024-01-28 | End: 2024-01-28

## 2024-01-28 RX ORDER — KETOROLAC TROMETHAMINE 30 MG/ML
15 INJECTION, SOLUTION INTRAMUSCULAR; INTRAVENOUS ONCE
Status: COMPLETED | OUTPATIENT
Start: 2024-01-28 | End: 2024-01-28

## 2024-01-28 RX ORDER — ORPHENADRINE CITRATE 100 MG/1
100 TABLET, EXTENDED RELEASE ORAL 2 TIMES DAILY PRN
Qty: 20 TABLET | Refills: 0 | Status: SHIPPED | OUTPATIENT
Start: 2024-01-28 | End: 2024-02-27

## 2024-01-28 RX ORDER — KETOROLAC TROMETHAMINE 10 MG/1
10 TABLET, FILM COATED ORAL EVERY 6 HOURS PRN
Qty: 20 TABLET | Refills: 0 | Status: SHIPPED | OUTPATIENT
Start: 2024-01-28 | End: 2024-02-02

## 2024-01-28 RX ADMIN — LABETALOL HYDROCHLORIDE 20 MG: 5 INJECTION, SOLUTION INTRAVENOUS at 21:13

## 2024-01-28 RX ADMIN — SODIUM CHLORIDE 500 ML: 9 INJECTION, SOLUTION INTRAVENOUS at 19:55

## 2024-01-28 RX ADMIN — KETOROLAC TROMETHAMINE 15 MG: 30 INJECTION INTRAMUSCULAR; INTRAVENOUS at 20:43

## 2024-01-28 RX ADMIN — HYDROMORPHONE HYDROCHLORIDE 1 MG: 1 INJECTION, SOLUTION INTRAMUSCULAR; INTRAVENOUS; SUBCUTANEOUS at 20:42

## 2024-01-28 ASSESSMENT — LIFESTYLE VARIABLES
REASON UNABLE TO ASSESS: NO
HAVE PEOPLE ANNOYED YOU BY CRITICIZING YOUR DRINKING: NO
HAVE YOU EVER FELT YOU SHOULD CUT DOWN ON YOUR DRINKING: NO
EVER FELT BAD OR GUILTY ABOUT YOUR DRINKING: NO
EVER HAD A DRINK FIRST THING IN THE MORNING TO STEADY YOUR NERVES TO GET RID OF A HANGOVER: NO

## 2024-01-28 ASSESSMENT — PAIN DESCRIPTION - LOCATION: LOCATION: BACK

## 2024-01-28 ASSESSMENT — PAIN - FUNCTIONAL ASSESSMENT: PAIN_FUNCTIONAL_ASSESSMENT: 0-10

## 2024-01-28 ASSESSMENT — COLUMBIA-SUICIDE SEVERITY RATING SCALE - C-SSRS
2. HAVE YOU ACTUALLY HAD ANY THOUGHTS OF KILLING YOURSELF?: NO
6. HAVE YOU EVER DONE ANYTHING, STARTED TO DO ANYTHING, OR PREPARED TO DO ANYTHING TO END YOUR LIFE?: NO
1. IN THE PAST MONTH, HAVE YOU WISHED YOU WERE DEAD OR WISHED YOU COULD GO TO SLEEP AND NOT WAKE UP?: NO

## 2024-01-28 ASSESSMENT — PAIN DESCRIPTION - PAIN TYPE: TYPE: CHRONIC PAIN

## 2024-01-28 ASSESSMENT — PAIN SCALES - GENERAL: PAINLEVEL_OUTOF10: 10 - WORST POSSIBLE PAIN

## 2024-01-29 NOTE — ED PROVIDER NOTES
HPI   Chief Complaint   Patient presents with    Back Pain     Chronic back pain that has been getting worse over the past two days        Chief complaint back pain  History of present illness 43-year-old male who is here with lower lumbosacral pain that is been present for 2 days unrelieved by over-the-counter medicines.  He denies chest pain shortness of breath however he is also here with a blood pressure 201/101 pulse 84 respirate is 18 pulse ox 99% initial EKG reveals a normal sinus rhythm no acute MI pattern slight T wave inversions in 3 and aVF, patient continues with a 2-day history of lower lumbosacral pain has had disc problems in the past this symptoms have not been relieved denies weakness in the legs or loss of bowel or bladder function.  Denies nausea vomiting                          [place] Coma Scale Score: 15                  Patient History   No past medical history on file.  Past Surgical History:   Procedure Laterality Date    OTHER SURGICAL HISTORY  05/02/2019    Abdominal surgery     Family History   Problem Relation Name Age of Onset    No Known Problems Mother      No Known Problems Father       Social History     Tobacco Use    Smoking status: Not on file    Smokeless tobacco: Not on file   Substance Use Topics    Alcohol use: Not on file    Drug use: Not on file       Physical Exam   ED Triage Vitals [01/28/24 1929]   Temp Heart Rate Respirations BP   -- 84 18 (!) 201/111      Pulse Ox Temp src Heart Rate Source Patient Position   99 % -- Monitor Standing      BP Location FiO2 (%)     Right arm --       Physical Exam  Vitals and nursing note reviewed.   Constitutional:       General: He is in acute distress.      Appearance: He is obese.   HENT:      Head: Normocephalic and atraumatic.      Right Ear: Tympanic membrane normal.      Left Ear: Tympanic membrane normal.      Nose: Nose normal.      Mouth/Throat:      Mouth: Mucous membranes are dry.   Eyes:      Extraocular Movements:  Extraocular movements intact.      Pupils: Pupils are equal, round, and reactive to light.   Cardiovascular:      Rate and Rhythm: Normal rate and regular rhythm.   Pulmonary:      Effort: Pulmonary effort is normal.   Abdominal:      General: Abdomen is flat.   Musculoskeletal:      Comments: Lumbosacral tenderness was noted no saddle anesthesia and awake was intact motor function 5 out of 5 gait normal sensation grossly intact reflexes are intact   Neurological:      General: No focal deficit present.      Mental Status: He is alert. Mental status is at baseline.      Cranial Nerves: No cranial nerve deficit.      Sensory: No sensory deficit.      Motor: No weakness.      Coordination: Coordination normal.      Gait: Gait normal.      Deep Tendon Reflexes: Reflexes normal.   Psychiatric:         Mood and Affect: Mood normal.         ED Course & MDM   Diagnoses as of 01/28/24 2201   Lumbar strain, initial encounter   Acute low back pain, unspecified back pain laterality, unspecified whether sciatica present   Hypertension, unspecified type   Smoking       Medical Decision Making  Differential diagnosis considered  #1 lumbar strain  #2 degenerative disease of the lumbar spine  #3 lumbar fracture  #4 cauda equina syndrome  Differential diagnosis for elevated blood pressure  #1 uncontrolled hypertension  #2 noncompliance  #3 acute coronary syndrome  #4 renovascular issues  Considering the above differential diagnosis the following was treatment and testing were considered in order with shared decision making  CT lumbar spine IV Dilaudid IV Toradol  EKG cardiac monitor IV fluids IV hydralazine CBC electrolytes BNP    Amount and/or Complexity of Data Reviewed  Labs: ordered. Decision-making details documented in ED Course.     Details: cardiac workup was negative BUN 14 creatinine 0.9 troponin 6  Radiology: ordered and independent interpretation performed.     Details: CT lumbar spine reveals L4-L5 herniated  disc    Risk  Risk Details: Patient had relief with Toradol and Dilaudid blood pressure somewhat improved.  Recommendations to follow Dr. Kilgore in regards to lumbar spine and if he needs a TENS unit, follow-up for hypertension, also follow-up for smoking cessation      Labs Reviewed   BASIC METABOLIC PANEL - Normal       Result Value    Glucose 90      Sodium 139      Potassium 4.3      Chloride 103      Bicarbonate 26      Anion Gap 14      Urea Nitrogen 14      Creatinine 0.96      eGFR >90      Calcium 9.7     PHOSPHORUS - Normal    Phosphorus 3.8     MAGNESIUM - Normal    Magnesium 2.05     TROPONIN I, HIGH SENSITIVITY - Normal    Troponin I, High Sensitivity 6      Narrative:     Less than 99th percentile of normal range cutoff-  Female and children under 18 years old <14 ng/L; Male <21 ng/L: Negative  Repeat testing should be performed if clinically indicated.     Female and children under 18 years old 14-50 ng/L; Male 21-50 ng/L:  Consistent with possible cardiac damage and possible increased clinical   risk. Serial measurements may help to assess extent of myocardial damage.     >50 ng/L: Consistent with cardiac damage, increased clinical risk and  myocardial infarction. Serial measurements may help assess extent of   myocardial damage.      NOTE: Children less than 1 year old may have higher baseline troponin   levels and results should be interpreted in conjunction with the overall   clinical context.     NOTE: Troponin I testing is performed using a different   testing methodology at [facility] than at other   [facility] hospitals. Direct result comparisons should only   be made within the same method.   CBC WITH AUTO DIFFERENTIAL    WBC 6.0      nRBC 0.0      RBC 5.50      Hemoglobin 16.6      Hematocrit 50.8      MCV 92      MCH 30.2      MCHC 32.7      RDW 13.9      Platelets 266      Neutrophils % 45.5      Immature Granulocytes %, Automated 0.5      Lymphocytes % 37.1      Monocytes %  12.4      Eosinophils % 3.7      Basophils % 0.8      Neutrophils Absolute 2.73      Immature Granulocytes Absolute, Automated 0.03      Lymphocytes Absolute 2.22      Monocytes Absolute 0.74      Eosinophils Absolute 0.22      Basophils Absolute 0.05     URINALYSIS WITH REFLEX CULTURE AND MICROSCOPIC    Narrative:     The following orders were created for panel order Urinalysis with Reflex Culture and Microscopic.  Procedure                               Abnormality         Status                     ---------                               -----------         ------                     Urinalysis with Reflex C...[[id number]]                                                 Extra Urine Gray Tube[[id number]]                                                         Please view results for these tests on the individual orders.   URINALYSIS WITH REFLEX CULTURE AND MICROSCOPIC   EXTRA URINE GRAY TUBE        CT lumbar spine wo IV contrast   Final Result   1. No acute osseous abnormality   2. Moderate spinal degenerative change at L4-L5 contributing to mild   spinal canal stenosis and mild-to-moderate bilateral neural foraminal   stenosis.   3. Cholelithiasis.        MACRO:   None        Signed by: Jak Wang 1/28/2024 9:31 PM   Dictation workstation:   [id number]           Procedure  ECG 12 lead    Performed by: Michelle Cardenas MD  Authorized by: Michelle Cardenas MD    ECG interpreted by ED Physician in the absence of a cardiologist: yes    Interpretation:     Interpretation: normal    Rate:     ECG rate:  75  Rhythm:     Rhythm: sinus rhythm    Ectopy:     Ectopy: none    QRS:     QRS axis:  Normal  ST segments:     ST segments:  Normal  T waves:     T waves: inverted      Inverted:  III and aVF       Michelle Cardenas MD  01/28/24 1286

## 2024-01-30 ENCOUNTER — OFFICE VISIT (OUTPATIENT)
Dept: PRIMARY CARE | Facility: CLINIC | Age: 44
End: 2024-01-30
Payer: COMMERCIAL

## 2024-01-30 VITALS
HEART RATE: 73 BPM | OXYGEN SATURATION: 97 % | DIASTOLIC BLOOD PRESSURE: 98 MMHG | WEIGHT: 263.4 LBS | SYSTOLIC BLOOD PRESSURE: 152 MMHG | HEIGHT: 67 IN | BODY MASS INDEX: 41.34 KG/M2

## 2024-01-30 DIAGNOSIS — M54.50 ACUTE LOW BACK PAIN, UNSPECIFIED BACK PAIN LATERALITY, UNSPECIFIED WHETHER SCIATICA PRESENT: ICD-10-CM

## 2024-01-30 DIAGNOSIS — S39.012A LUMBAR STRAIN, INITIAL ENCOUNTER: ICD-10-CM

## 2024-01-30 DIAGNOSIS — I10 PRIMARY HYPERTENSION: ICD-10-CM

## 2024-01-30 DIAGNOSIS — M47.26 OSTEOARTHRITIS OF SPINE WITH RADICULOPATHY, LUMBAR REGION: ICD-10-CM

## 2024-01-30 DIAGNOSIS — S39.012D STRAIN OF LUMBAR REGION, SUBSEQUENT ENCOUNTER: ICD-10-CM

## 2024-01-30 DIAGNOSIS — M62.830 SPASM OF LUMBAR PARASPINOUS MUSCLE: ICD-10-CM

## 2024-01-30 PROBLEM — K80.20 GALLSTONES: Status: ACTIVE | Noted: 2024-01-30

## 2024-01-30 PROBLEM — H65.90 SEROUS OTITIS MEDIA: Status: RESOLVED | Noted: 2023-03-09 | Resolved: 2024-01-30

## 2024-01-30 LAB
ATRIAL RATE: 75 BPM
P AXIS: 44 DEGREES
P OFFSET: 183 MS
P ONSET: 127 MS
PR INTERVAL: 172 MS
Q ONSET: 213 MS
QRS COUNT: 13 BEATS
QRS DURATION: 94 MS
QT INTERVAL: 366 MS
QTC CALCULATION(BAZETT): 408 MS
QTC FREDERICIA: 394 MS
R AXIS: 35 DEGREES
T AXIS: -19 DEGREES
T OFFSET: 396 MS
VENTRICULAR RATE: 75 BPM

## 2024-01-30 PROCEDURE — 3080F DIAST BP >= 90 MM HG: CPT | Performed by: FAMILY MEDICINE

## 2024-01-30 PROCEDURE — 4004F PT TOBACCO SCREEN RCVD TLK: CPT | Performed by: FAMILY MEDICINE

## 2024-01-30 PROCEDURE — 99214 OFFICE O/P EST MOD 30 MIN: CPT | Performed by: FAMILY MEDICINE

## 2024-01-30 PROCEDURE — 3077F SYST BP >= 140 MM HG: CPT | Performed by: FAMILY MEDICINE

## 2024-01-30 PROCEDURE — 96372 THER/PROPH/DIAG INJ SC/IM: CPT | Performed by: FAMILY MEDICINE

## 2024-01-30 RX ORDER — KETOROLAC TROMETHAMINE 30 MG/ML
60 INJECTION, SOLUTION INTRAMUSCULAR; INTRAVENOUS ONCE
Status: COMPLETED | OUTPATIENT
Start: 2024-01-30 | End: 2024-01-30

## 2024-01-30 RX ORDER — LOSARTAN POTASSIUM 50 MG/1
50 TABLET ORAL DAILY
Qty: 90 TABLET | Refills: 1 | Status: SHIPPED | OUTPATIENT
Start: 2024-01-30 | End: 2024-02-06 | Stop reason: SDUPTHER

## 2024-01-30 RX ORDER — PREDNISONE 10 MG/1
TABLET ORAL
Qty: 30 TABLET | Refills: 0 | Status: SHIPPED | OUTPATIENT
Start: 2024-01-30 | End: 2024-02-27 | Stop reason: ALTCHOICE

## 2024-01-30 RX ORDER — OXYCODONE AND ACETAMINOPHEN 7.5; 325 MG/1; MG/1
1 TABLET ORAL EVERY 6 HOURS PRN
Qty: 20 TABLET | Refills: 0 | Status: SHIPPED | OUTPATIENT
Start: 2024-01-30 | End: 2024-02-06 | Stop reason: SDUPTHER

## 2024-01-30 RX ORDER — HYDROCHLOROTHIAZIDE 25 MG/1
25 TABLET ORAL DAILY
Qty: 90 TABLET | Refills: 1 | Status: SHIPPED | OUTPATIENT
Start: 2024-01-30

## 2024-01-30 RX ORDER — AMLODIPINE BESYLATE 10 MG/1
10 TABLET ORAL DAILY
Qty: 90 TABLET | Refills: 1 | Status: SHIPPED | OUTPATIENT
Start: 2024-01-30

## 2024-01-30 RX ORDER — DICLOFENAC SODIUM 75 MG/1
75 TABLET, DELAYED RELEASE ORAL 2 TIMES DAILY PRN
Qty: 60 TABLET | Refills: 1 | Status: SHIPPED | OUTPATIENT
Start: 2024-01-30 | End: 2024-03-30

## 2024-01-30 RX ADMIN — KETOROLAC TROMETHAMINE 60 MG: 30 INJECTION, SOLUTION INTRAMUSCULAR; INTRAVENOUS at 14:11

## 2024-01-30 NOTE — PROGRESS NOTES
Subjective   Patient ID: Laci Sainz is a 43 y.o. male who presents for Back Pain.    HPI     Patient c/o lower back pain x 3 days. Patient went to ER on 1/28 and was given toradol, norflex, and percocet. He states the medication have given him mild relief. He reports he is experiencing muscle spasms and pain is radiating down bilateral legs. He rates his pain 10/10. It is aggravated by position changes. He reports prednisone helped in the past. He has also tried heating pad and ibuprofen 800 mg with no relief.       He would like to discuss referral to ortho for possible back injections.        Patient will need a work excuse.     Review of Systems  12 Systems have been reviewed as follows.  Constitutional: Fever, weight gain, weight loss, appetite change, night sweats, fatigue, chills.  Eyes : blurry, double vision, vision, loss, tearing, redness, pain, sensitivity to light, glaucoma.  Ears, nose, mouth, and throat: Hearing loss, ringing in the ears, ear pain, nasal congestion, nasal drainage, nosebleeds, mouth, throat, irritation tooth problem.  Cardiovascular :chest pain, pressure, heart racing, palpitations, sweating, leg swelling, high or low blood pressure  Pulmonary: Cough, yellow or green sputum, blood and sputum, shortness of breath, wheezing  Gastrointestinal: Nausea, vomiting, diarrhea, constipation, pain, blood in stool, or vomitus, heartburn, difficulty swallowing  Genitourinary: incontinence, abnormal bleeding, abnormal discharge, urinary frequency, urinary hesitancy, pain, impotence sexual problem, infection, urinary retention  Musculoskeletal: Pain, stiffness, joint, redness or warmth, arthritis, back pain, weakness, muscle wasting, sprain or fracture  Neuro: Weight weakness, dizziness, change in voice, change in taste change in vision, change in hearing, loss, or change of sensation, trouble walking, balance problems coordination problems, shaking, speech problem  Endocrine , cold or heat  "intolerance, blood sugar problem, weight gain or loss missed periods hot flashes, sweats, change in body hair, change in libido, increased thirst, increased urination  Heme/lymph: Swelling, bleeding, problem anemia, bruising, enlarged lymph nodes  Allergic/immunologic: H. plus nasal drip, watery itchy eyes, nasal drainage, immunosuppressed  The above were reviewed and noted negative except as noted in HPI and Problem List.    Objective   BP (!) 152/98 (BP Location: Right arm, Patient Position: Sitting)   Pulse 73   Ht 1.702 m (5' 7\")   Wt 119 kg (263 lb 6.4 oz)   SpO2 97%   BMI 41.25 kg/m²     Physical Exam  Constitutional: Well developed, well nourished, alert and in no acute distress   Eyes: Normal external exam. Pupils equally round and reactive to light with normal accommodation and extraocular movements intact.  Neck: Supple, no lymphadenopathy or masses.   Cardiovascular: Regular rate and rhythm, normal S1 and S2, no murmurs, gallops, or rubs. Radial pulses normal. No peripheral edema.  Pulmonary: No respiratory distress, lungs clear to auscultation bilaterally. No wheezes, rhonchi, rales.  Abdomen: soft,non tender, non distended, without masses or HSM  Skin: Warm, well perfused, normal skin turgor and color.   Neurologic: Cranial nerves II-XII grossly intact.   Psychiatric: Mood calm and affect normal  Musculoskeletal: Moving all extremities without restriction    Assessment/Plan   Problem List Items Addressed This Visit             ICD-10-CM    HTN (hypertension) I10    Relevant Medications    amLODIPine (Norvasc) 10 mg tablet    hydroCHLOROthiazide (HYDRODiuril) 25 mg tablet    losartan (Cozaar) 50 mg tablet    diclofenac (Voltaren) 75 mg EC tablet    Other Relevant Orders    Follow Up In [facility] - PCP - Established    Osteoarthritis of spine with radiculopathy, lumbar region M47.26    Relevant Medications    predniSONE (Deltasone) 10 mg tablet    diclofenac (Voltaren) 75 mg EC tablet    " ketorolac (Toradol) injection 60 mg (Start on 1/30/2024  2:00 PM)    Other Relevant Orders    Referral to Pain Medicine    Follow Up In [facility] - PCP - Established    Spasm of lumbar paraspinous muscle M62.830    Relevant Medications    oxyCODONE-acetaminophen (Percocet) 7.5-325 mg tablet    Other Relevant Orders    Follow Up In [facility] - PCP - Established     Other Visit Diagnoses         Codes    Strain of lumbar region, subsequent encounter     S39.012D    Relevant Medications    predniSONE (Deltasone) 10 mg tablet    diclofenac (Voltaren) 75 mg EC tablet    Other Relevant Orders    Follow Up In [facility] - PCP - Established    Lumbar strain, initial encounter     S39.012A    Relevant Orders    Follow Up In [facility] - PCP - Established    Acute low back pain, unspecified back pain laterality, unspecified whether sciatica present     M54.50    Relevant Orders    Follow Up In [facility] - PCP - Established               Provider Attestation - Scribe documentation    All medical record entries made by the Scribe were at my direction and personally dictated by me. I have reviewed the chart and agree that the record accurately reflects my personal performance of the history, physical exam, discussion and plan.    Scribe Attestation  By signing my name below, I, Arturo Roman MD   , Scribe   attest that this documentation has been prepared under the direction and in the presence of Arturo Roman MD.      -Tapering dose of prednisone    -Diclofenac 75 mg twice daily     -Take BP meds daily    -referral to pain management Dr. Michel    - PT & ICE    -oxycodone as needed    -off work 1/27-2/1 RTW 2/2/24

## 2024-02-06 ENCOUNTER — OFFICE VISIT (OUTPATIENT)
Dept: PRIMARY CARE | Facility: CLINIC | Age: 44
End: 2024-02-06
Payer: COMMERCIAL

## 2024-02-06 VITALS
HEART RATE: 68 BPM | HEIGHT: 66 IN | OXYGEN SATURATION: 98 % | WEIGHT: 260 LBS | DIASTOLIC BLOOD PRESSURE: 100 MMHG | SYSTOLIC BLOOD PRESSURE: 184 MMHG | BODY MASS INDEX: 41.78 KG/M2

## 2024-02-06 DIAGNOSIS — E66.01 MORBID OBESITY WITH BMI OF 40.0-44.9, ADULT (MULTI): Primary | ICD-10-CM

## 2024-02-06 DIAGNOSIS — I10 PRIMARY HYPERTENSION: ICD-10-CM

## 2024-02-06 DIAGNOSIS — M47.26 OSTEOARTHRITIS OF SPINE WITH RADICULOPATHY, LUMBAR REGION: ICD-10-CM

## 2024-02-06 DIAGNOSIS — S39.012D STRAIN OF LUMBAR REGION, SUBSEQUENT ENCOUNTER: ICD-10-CM

## 2024-02-06 DIAGNOSIS — M62.830 SPASM OF LUMBAR PARASPINOUS MUSCLE: ICD-10-CM

## 2024-02-06 DIAGNOSIS — M54.50 ACUTE LOW BACK PAIN, UNSPECIFIED BACK PAIN LATERALITY, UNSPECIFIED WHETHER SCIATICA PRESENT: ICD-10-CM

## 2024-02-06 DIAGNOSIS — D17.0 LIPOMA OF FACE: ICD-10-CM

## 2024-02-06 DIAGNOSIS — S39.012A LUMBAR STRAIN, INITIAL ENCOUNTER: ICD-10-CM

## 2024-02-06 PROBLEM — K80.20 GALLSTONES: Status: RESOLVED | Noted: 2024-01-30 | Resolved: 2024-02-06

## 2024-02-06 PROCEDURE — 99214 OFFICE O/P EST MOD 30 MIN: CPT | Performed by: FAMILY MEDICINE

## 2024-02-06 RX ORDER — LOSARTAN POTASSIUM 100 MG/1
100 TABLET ORAL DAILY
Qty: 90 TABLET | Refills: 1 | Status: SHIPPED | OUTPATIENT
Start: 2024-02-06 | End: 2024-04-05

## 2024-02-06 RX ORDER — SEMAGLUTIDE 0.25 MG/.5ML
0.25 INJECTION, SOLUTION SUBCUTANEOUS
Qty: 3 ML | Refills: 0 | Status: SHIPPED | OUTPATIENT
Start: 2024-02-06 | End: 2024-02-27 | Stop reason: SDUPTHER

## 2024-02-06 RX ORDER — LOSARTAN POTASSIUM 100 MG/1
100 TABLET ORAL DAILY
Qty: 30 TABLET | Refills: 2 | Status: SHIPPED | OUTPATIENT
Start: 2024-02-06 | End: 2024-02-06

## 2024-02-06 RX ORDER — OXYCODONE AND ACETAMINOPHEN 7.5; 325 MG/1; MG/1
1 TABLET ORAL EVERY 6 HOURS PRN
Qty: 20 TABLET | Refills: 0 | Status: SHIPPED | OUTPATIENT
Start: 2024-02-06 | End: 2024-02-13

## 2024-02-06 NOTE — PROGRESS NOTES
Subjective   Patient ID: Laci Sainz is a 43 y.o. male who presents for Hypertension.    HPI Pt states he checks his BP off and on and it is usually runs high. (Unsure of numbers)    Review of Systems  12 Systems have been reviewed as follows.  Constitutional: Fever, weight gain, weight loss, appetite change, night sweats, fatigue, chills.  Eyes : blurry, double vision, vision, loss, tearing, redness, pain, sensitivity to light, glaucoma.  Ears, nose, mouth, and throat: Hearing loss, ringing in the ears, ear pain, nasal congestion, nasal drainage, nosebleeds, mouth, throat, irritation tooth problem.  Cardiovascular :chest pain, pressure, heart racing, palpitations, sweating, leg swelling, high or low blood pressure  Pulmonary: Cough, yellow or green sputum, blood and sputum, shortness of breath, wheezing  Gastrointestinal: Nausea, vomiting, diarrhea, constipation, pain, blood in stool, or vomitus, heartburn, difficulty swallowing  Genitourinary: incontinence, abnormal bleeding, abnormal discharge, urinary frequency, urinary hesitancy, pain, impotence sexual problem, infection, urinary retention  Musculoskeletal: Pain, stiffness, joint, redness or warmth, arthritis, back pain, weakness, muscle wasting, sprain or fracture  Neuro: Weight weakness, dizziness, change in voice, change in taste change in vision, change in hearing, loss, or change of sensation, trouble walking, balance problems coordination problems, shaking, speech problem  Endocrine , cold or heat intolerance, blood sugar problem, weight gain or loss missed periods hot flashes, sweats, change in body hair, change in libido, increased thirst, increased urination  Heme/lymph: Swelling, bleeding, problem anemia, bruising, enlarged lymph nodes  Allergic/immunologic: H. plus nasal drip, watery itchy eyes, nasal drainage, immunosuppressed  The above were reviewed and noted negative except as noted in HPI and Problem List.    Objective   BP (!) 184/100 (BP  "Location: Left arm, Patient Position: Sitting, BP Cuff Size: Adult)   Pulse 68   Ht 1.676 m (5' 6\")   Wt 118 kg (260 lb)   SpO2 98%   BMI 41.97 kg/m²     Physical Exam  Constitutional: Well developed, well nourished, alert and in no acute distress   Eyes: Normal external exam. Pupils equally round and reactive to light with normal accommodation and extraocular movements intact.  Neck: Supple, no lymphadenopathy or masses.   Cardiovascular: Regular rate and rhythm, normal S1 and S2, no murmurs, gallops, or rubs. Radial pulses normal. No peripheral edema.  Pulmonary: No respiratory distress, lungs clear to auscultation bilaterally. No wheezes, rhonchi, rales.  Abdomen: soft,non tender, non distended, without masses or HSM  Skin: Warm, well perfused, normal skin turgor and color.   Neurologic: Cranial nerves II-XII grossly intact.   Psychiatric: Mood calm and affect normal  Musculoskeletal: Moving all extremities without restriction    Assessment/Plan   Problem List Items Addressed This Visit             ICD-10-CM    HTN (hypertension) I10    Relevant Medications    losartan (Cozaar) 100 mg tablet    semaglutide, weight loss, (Wegovy) 0.25 mg/0.5 mL pen injector    Other Relevant Orders    Follow Up In [facility] - PCP - Established    Follow Up In [facility] - Pharmacy    Osteoarthritis of spine with radiculopathy, lumbar region M47.26    Relevant Orders    Follow Up In [facility] - PCP - Established    RESOLVED: Spasm of lumbar paraspinous muscle M62.830    Relevant Medications    oxyCODONE-acetaminophen (Percocet) 7.5-325 mg tablet    Other Relevant Orders    Follow Up In [facility] - PCP - Established    Morbid obesity with BMI of 40.0-44.9, adult (CMS/[facility]) - Primary E66.01, Z68.41    Relevant Medications    semaglutide, weight loss, (Wegovy) 0.25 mg/0.5 mL pen injector    Other Relevant Orders    Follow Up In [facility] - PCP - Established    Follow Up In " [facility] - Pharmacy     Other Visit Diagnoses         Codes    Strain of lumbar region, subsequent encounter     S39.012D    Relevant Orders    Follow Up In [facility] - PCP - Established    Lumbar strain, initial encounter     S39.012A    Relevant Orders    Follow Up In [facility] - PCP - Established    Acute low back pain, unspecified back pain laterality, unspecified whether sciatica present     M54.50    Relevant Orders    Follow Up In [facility] - PCP - Established        Excise lipoma forehead in future     Continue current medications and therapy for chronic medical conditions    Increase losartan to 100 mg daily    Finish prednisone    -Diclofenac 75 mg twice daily      -Take BP meds daily     -referral to pain management Dr. Michel     - PT & ICE     -oxycodone as needed    -start wegovy 0.25 mg weekly  -pharmacy referral ordered    I have personally reviewed the [facility] report with the patient and have considered the risk of abuse, addiction, dependence and diversion.    Patient's use of medication is allowing patient to be able to perform ADL's. Patient is always being evaluated for the possibility of lowering the medication dosage.

## 2024-02-22 ENCOUNTER — TELEMEDICINE (OUTPATIENT)
Dept: PHARMACY | Facility: HOSPITAL | Age: 44
End: 2024-02-22
Payer: COMMERCIAL

## 2024-02-22 DIAGNOSIS — I10 PRIMARY HYPERTENSION: ICD-10-CM

## 2024-02-22 DIAGNOSIS — E66.01 MORBID OBESITY WITH BMI OF 40.0-44.9, ADULT (MULTI): ICD-10-CM

## 2024-02-22 NOTE — PROGRESS NOTES
"Pharmacist Clinic: Weight Management    Laci Sainz was referred to the Clinical Pharmacy Team for weight management.     Referring Provider: Arturo Roman MD  Problem List Items Addressed This Visit       HTN (hypertension)    Morbid obesity with BMI of 40.0-44.9, adult (CMS/[facility])       HISTORY OF PRESENT ILLNESS    Patient is a 43 y.o. male presenting for initial clinical pharmacy visit for weight management. He was recently prescribed Wegovy but has been unable to get it from the pharmacy. He reported that the pharmacy said that it required a prior authorization. Due to insurance being down for the last two days test claims could not be ran to determine what would be covered on the patient's insurance plan.     Patient reported his overall goal was to feel better. He said he wanted to get the scale down but did not say a specific number. He reported he has had family members have gastric bypass and does not want to have to get that done.    Diet:   - He works 7pm-7am so he reported he doesn't eat the traditional meals. He says that there are 8 children living in his house. 3 kids are his girlfriends and 5 kids are his girlfriend's sisters and therefore they often get take out or he just eats whatever he can find in the house. He stated often times what he can find is \"junk food.\"  -He reported when he is working he does eat less however when he has days off that tends to be when he is eating the \"junk food\"  Breakfast: Will pickup chicken from [employer] store, popcorn, or avocado on his way into work.   Lunch: Mckay Johns  Dinner: does not eat    Exercise Routine:   - Has not been exercising but has been wanting to get back into the gym. Stated on the phone he plans on getting a gym membership either later today or tomorrow.    Additional Contributory Factors: Family history of obesity     PHARMACY ASSESSMENT    Allergies: Patient has no known allergies.     [id number] - [place], [place] - [address] "[facility] AT [address] OF [facility] [address]  [address] [facility] [address]  Phone: [phone] Fax: [phone]    [facility] [place] - [address] [facility] AT Portal to Registered [facility] Sites  [address] [facility]  [place] [address]  Phone: [phone] Fax: [phone]    [facility]  [address], [address]  [facility] [address]  Phone: [phone] Fax: [phone]      Affordability/Accessibility: Wegovy PA    CURRENT PHARMACOTHERAPY  - N/A     DRUG INTERACTIONS  - No significant drug-drug interactions exist that require adjustment to therapy    LAB  Lab Results   Component Value Date    BILITOT 0.5 06/13/2020    CALCIUM 9.7 01/28/2024    CO2 26 01/28/2024     01/28/2024    CREATININE 0.96 01/28/2024    GLUCOSE 90 01/28/2024    ALKPHOS 93 06/13/2020    K 4.3 01/28/2024    PROT 7.6 06/13/2020     01/28/2024    AST 31 06/13/2020    ALT 35 06/13/2020    BUN 14 01/28/2024    ANIONGAP 14 01/28/2024    MG 2.05 01/28/2024    PHOS 3.8 01/28/2024    ALBUMIN 4.9 06/13/2020    LIPASE 23 06/13/2020    EGFR >90 01/28/2024     No results found for: \"TRIG\", \"CHOL\", \"LDLCALC\", \"HDL\"  No results found for: \"HGBA1C\"    Current Outpatient Medications on File Prior to Visit   Medication Sig Dispense Refill    amLODIPine (Norvasc) 10 mg tablet Take 1 tablet (10 mg) by mouth once daily. 90 tablet 1    diclofenac (Voltaren) 75 mg EC tablet Take 1 tablet (75 mg) by mouth 2 times a day as needed (pain). Do not crush, chew, or split. 60 tablet 1    hydroCHLOROthiazide (HYDRODiuril) 25 mg tablet Take 1 tablet (25 mg) by mouth once daily. 90 tablet 1    losartan (Cozaar) 100 mg tablet TAKE 1 TABLET(100 MG) BY MOUTH EVERY DAY 90 tablet 1    orphenadrine (Norflex) 100 mg 12 hr tablet Take 1 tablet (100 mg) by mouth 2 times a day as needed for muscle spasms for up to 10 days. Do not crush, chew, or split. 20 tablet 0    predniSONE (Deltasone) 10 " mg tablet Take 4 tablets x 3 days, 3 tablets x 3 days, 2 tablets x 3 days, and 1 tab x 3 days 30 tablet 0    semaglutide, weight loss, (Wegovy) 0.25 mg/0.5 mL pen injector Inject 0.25 mg under the skin every 7 days. 3 mL 0    sildenafil (Viagra) 100 mg tablet once daily as needed. Take 1 tablet daily 1 hour before needed       No current facility-administered medications on file prior to visit.     PATIENT EDUCATION/GOALS  - Goals: Overall feel better. Stated he would like to see the numbers on the scale go down but did not mention a specific number goal.   - Discussed starting to make small lifestyle changes to build up to larger ones. Provided the example of scheduling a 30 minute walk a few times a week.  - Informed patient about insurance being down for the last 2 days and was unable to determine which GLP1s would be covered, if any, and his insurance plan. Discussed sending in a prescription for Zepbound due to knowing it is not on back order like Wegov and seeing if it ends up getting covered.     Zepbound Education:  - Counseled patient on MOA, expectations, side effects, duration of therapy, contraindications, administration, and monitoring parameters  - Answered all patient questions and concerns  - Counseled patient on Zepbound MOA, expectations, side effects, duration of therapy, administration, and monitoring parameters.  - Provided detailed dosing and administration counseling to ensure proper technique.   - Reviewed Zepbound titration schedule, starting with 2.5 mg once weekly to a goal of 15 mg once weekly if tolerated  - Counseled patient on the benefits of GLP-1ra glycemic control and weight loss  - Reviewed storage requirements of Zepbound when not in use, and when to administer the medication if a dose is missed.  - Advised patient that they may experience improved satiety after meals and portion sizes of meals may be reduced as doses of Zepbound increase.    RECOMMENDATIONS/PLAN  START Zepbound  2.5 mg once weekly injection   Prescription sent to [facility] for mail order   START making healthy diet and lifestyle choices     Follow up with Clinical Pharmacy Team 3/7/2024 at 11am    Continue all meds under the continuation of care with the referring provider and clinical pharmacy team.    Please reach out to the Clinical Pharmacy Team if there are any further questions.     Verbal consent to manage patient's drug therapy was obtained from patient. They were informed they may decline to participate or withdraw from participation in pharmacy services at any time.    Abena Antony, PharmD  PGY-1 Pharmacy Resident  [phone]

## 2024-02-22 NOTE — PROGRESS NOTES
I reviewed the progress note and agree with the resident’s findings and plans as written. Case discussed with resident.    Deedee Edgar, PharmD  [phone]

## 2024-02-27 ENCOUNTER — OFFICE VISIT (OUTPATIENT)
Dept: PRIMARY CARE | Facility: CLINIC | Age: 44
End: 2024-02-27
Payer: COMMERCIAL

## 2024-02-27 VITALS
RESPIRATION RATE: 16 BRPM | DIASTOLIC BLOOD PRESSURE: 68 MMHG | OXYGEN SATURATION: 98 % | HEART RATE: 102 BPM | HEIGHT: 66 IN | BODY MASS INDEX: 41.62 KG/M2 | WEIGHT: 259 LBS | SYSTOLIC BLOOD PRESSURE: 126 MMHG

## 2024-02-27 DIAGNOSIS — S39.012A LUMBAR STRAIN, INITIAL ENCOUNTER: ICD-10-CM

## 2024-02-27 DIAGNOSIS — M47.26 OSTEOARTHRITIS OF SPINE WITH RADICULOPATHY, LUMBAR REGION: ICD-10-CM

## 2024-02-27 DIAGNOSIS — E66.01 MORBID OBESITY WITH BMI OF 40.0-44.9, ADULT (MULTI): ICD-10-CM

## 2024-02-27 DIAGNOSIS — S39.012D STRAIN OF LUMBAR REGION, SUBSEQUENT ENCOUNTER: ICD-10-CM

## 2024-02-27 DIAGNOSIS — M54.50 ACUTE LOW BACK PAIN, UNSPECIFIED BACK PAIN LATERALITY, UNSPECIFIED WHETHER SCIATICA PRESENT: ICD-10-CM

## 2024-02-27 DIAGNOSIS — I10 PRIMARY HYPERTENSION: ICD-10-CM

## 2024-02-27 DIAGNOSIS — M62.830 SPASM OF LUMBAR PARASPINOUS MUSCLE: ICD-10-CM

## 2024-02-27 DIAGNOSIS — Z79.899 MEDICATION MANAGEMENT: ICD-10-CM

## 2024-02-27 PROCEDURE — 80368 SEDATIVE HYPNOTICS: CPT

## 2024-02-27 PROCEDURE — 80354 DRUG SCREENING FENTANYL: CPT

## 2024-02-27 PROCEDURE — 80365 DRUG SCREENING OXYCODONE: CPT

## 2024-02-27 PROCEDURE — 99214 OFFICE O/P EST MOD 30 MIN: CPT | Performed by: FAMILY MEDICINE

## 2024-02-27 PROCEDURE — 80373 DRUG SCREENING TRAMADOL: CPT

## 2024-02-27 PROCEDURE — 80346 BENZODIAZEPINES1-12: CPT

## 2024-02-27 PROCEDURE — 80361 OPIATES 1 OR MORE: CPT

## 2024-02-27 PROCEDURE — 80358 DRUG SCREENING METHADONE: CPT

## 2024-02-27 PROCEDURE — 80307 DRUG TEST PRSMV CHEM ANLYZR: CPT

## 2024-02-27 PROCEDURE — 82570 ASSAY OF URINE CREATININE: CPT

## 2024-02-27 RX ORDER — OXYCODONE AND ACETAMINOPHEN 7.5; 325 MG/1; MG/1
1 TABLET ORAL EVERY 6 HOURS PRN
COMMUNITY
End: 2024-02-27 | Stop reason: SDUPTHER

## 2024-02-27 RX ORDER — SEMAGLUTIDE 0.25 MG/.5ML
0.25 INJECTION, SOLUTION SUBCUTANEOUS
Qty: 2 ML | Refills: 1 | Status: SHIPPED | OUTPATIENT
Start: 2024-02-27 | End: 2024-04-04 | Stop reason: SDUPTHER

## 2024-02-27 RX ORDER — OXYCODONE AND ACETAMINOPHEN 7.5; 325 MG/1; MG/1
1 TABLET ORAL EVERY 6 HOURS PRN
Qty: 30 TABLET | Refills: 0 | Status: SHIPPED | OUTPATIENT
Start: 2024-02-27 | End: 2024-04-11 | Stop reason: SDUPTHER

## 2024-02-27 RX ORDER — NALOXONE HYDROCHLORIDE 4 MG/.1ML
4 SPRAY NASAL AS NEEDED
Qty: 2 EACH | Refills: 0 | Status: SHIPPED | OUTPATIENT
Start: 2024-02-27

## 2024-02-27 RX ORDER — OXYCODONE AND ACETAMINOPHEN 5; 325 MG/1; MG/1
TABLET ORAL
COMMUNITY
End: 2024-02-27 | Stop reason: DRUGHIGH

## 2024-02-27 NOTE — PROGRESS NOTES
Subjective   Patient ID: Laci Sainz is a 43 y.o. male who presents for Obesity.    HPI   Patient is following up on Wegovy 0.25 mg use for Obesity. Patient was unable to get the medications, Wegovy or Zepbound, due to availability. Patient was told the medication is $400.  He did loss 1 pound today.    Back pain is still present. He is taking diclofenac tablets and still has some Percocet .    Patient is asking for refill of the Percocet.    He is asking if hypertension medication is ok to take at 5 pm which is his normal wake up time. He works night shift.        Review of Systems  12 Systems have been reviewed as follows.  Constitutional: Fever, weight gain, weight loss, appetite change, night sweats, fatigue, chills.  Eyes : blurry, double vision, vision, loss, tearing, redness, pain, sensitivity to light, glaucoma.  Ears, nose, mouth, and throat: Hearing loss, ringing in the ears, ear pain, nasal congestion, nasal drainage, nosebleeds, mouth, throat, irritation tooth problem.  Cardiovascular :chest pain, pressure, heart racing, palpitations, sweating, leg swelling, high or low blood pressure  Pulmonary: Cough, yellow or green sputum, blood and sputum, shortness of breath, wheezing  Gastrointestinal: Nausea, vomiting, diarrhea, constipation, pain, blood in stool, or vomitus, heartburn, difficulty swallowing  Genitourinary: incontinence, abnormal bleeding, abnormal discharge, urinary frequency, urinary hesitancy, pain, impotence sexual problem, infection, urinary retention  Musculoskeletal: Pain, stiffness, joint, redness or warmth, arthritis, back pain, weakness, muscle wasting, sprain or fracture  Neuro: Weight weakness, dizziness, change in voice, change in taste change in vision, change in hearing, loss, or change of sensation, trouble walking, balance problems coordination problems, shaking, speech problem  Endocrine , cold or heat intolerance, blood sugar problem, weight gain or loss missed periods  "hot flashes, sweats, change in body hair, change in libido, increased thirst, increased urination  Heme/lymph: Swelling, bleeding, problem anemia, bruising, enlarged lymph nodes  Allergic/immunologic: H. plus nasal drip, watery itchy eyes, nasal drainage, immunosuppressed  The above were reviewed and noted negative except as noted in HPI and Problem List.        Objective   /68 (BP Location: Left arm, Patient Position: Sitting, BP Cuff Size: Adult)   Pulse 102   Resp 16   Ht 1.676 m (5' 6\")   Wt 117 kg (259 lb)   SpO2 98%   BMI 41.80 kg/m²     Physical Exam  Constitutional: Well developed, well nourished, alert and in no acute distress   Eyes: Normal external exam. Pupils equally round and reactive to light with normal accommodation and extraocular movements intact.  Neck: Supple, no lymphadenopathy or masses.   Cardiovascular: Regular rate and rhythm, normal S1 and S2, no murmurs, gallops, or rubs. Radial pulses normal. No peripheral edema.  Pulmonary: No respiratory distress, lungs clear to auscultation bilaterally. No wheezes, rhonchi, rales.  Abdomen: soft,non tender, non distended, without masses or HSM  Skin: Warm, well perfused, normal skin turgor and color.   Neurologic: Cranial nerves II-XII grossly intact.   Psychiatric: Mood calm and affect normal  Musculoskeletal: Moving all extremities without restriction      Assessment/Plan   Problem List Items Addressed This Visit             ICD-10-CM    HTN (hypertension) I10    Relevant Medications    semaglutide, weight loss, (Wegovy) 0.25 mg/0.5 mL pen injector    Osteoarthritis of spine with radiculopathy, lumbar region M47.26    Relevant Medications    oxyCODONE-acetaminophen (Percocet) 7.5-325 mg tablet    naloxone (Narcan) 4 mg/0.1 mL nasal spray    Morbid obesity with BMI of 40.0-44.9, adult (CMS/HCC) E66.01, Z68.41    Relevant Medications    semaglutide, weight loss, (Wegovy) 0.25 mg/0.5 mL pen injector     Other Visit Diagnoses         Codes "    Strain of lumbar region, subsequent encounter     S39.012D    Relevant Medications    oxyCODONE-acetaminophen (Percocet) 7.5-325 mg tablet    Spasm of lumbar paraspinous muscle     M62.830    Lumbar strain, initial encounter     S39.012A    Acute low back pain, unspecified back pain laterality, unspecified whether sciatica present     M54.50    Medication management     Z79.899    Relevant Orders    Opiate/Opioid/Benzo Prescription Compliance    OOB Internal Tracking          Excise lipoma forehead in future  Continue current medications and therapy for chronic medical conditions      losartan 100 mg daily     Finish prednisone     -Diclofenac 75 mg twice daily      -Take BP meds daily     -referral to pain management Dr. Michel     - PT & ICE     -oxycodone as needed     -start wegovy 0.25 mg weekly           I have personally reviewed the OARRS report with the patient and have considered the risk of abuse, addiction, dependence and diversion.    Patient's use of medication is allowing patient to be able to perform ADL's. Patient is always being evaluated for the possibility of lowering the medication dosage.    Provider Attestation - Scribe documentation    All medical record entries made by the Scribe were at my direction and personally dictated by me. I have reviewed the chart and agree that the record accurately reflects my personal performance of the history, physical exam, discussion and plan.    Scribe Attestation  By signing my name below, I, Arturo Roman MD   , Scribe   attest that this documentation has been prepared under the direction and in the presence of Arturo Roman MD.

## 2024-02-28 LAB
AMPHETAMINES UR QL SCN: NORMAL
BARBITURATES UR QL SCN: NORMAL
BZE UR QL SCN: NORMAL
CANNABINOIDS UR QL SCN: NORMAL
CREAT UR-MCNC: 228.8 MG/DL (ref 20–370)
PCP UR QL SCN: NORMAL

## 2024-04-04 ENCOUNTER — TELEMEDICINE (OUTPATIENT)
Dept: PHARMACY | Facility: HOSPITAL | Age: 44
End: 2024-04-04
Payer: COMMERCIAL

## 2024-04-04 DIAGNOSIS — E66.01 MORBID OBESITY WITH BMI OF 40.0-44.9, ADULT (MULTI): ICD-10-CM

## 2024-04-04 DIAGNOSIS — I10 PRIMARY HYPERTENSION: ICD-10-CM

## 2024-04-04 PROCEDURE — RXMED WILLOW AMBULATORY MEDICATION CHARGE

## 2024-04-04 RX ORDER — SEMAGLUTIDE 0.25 MG/.5ML
0.25 INJECTION, SOLUTION SUBCUTANEOUS
Qty: 2 ML | Refills: 1 | Status: SHIPPED | OUTPATIENT
Start: 2024-04-04 | End: 2024-05-01 | Stop reason: ALTCHOICE

## 2024-04-05 RX ORDER — LOSARTAN POTASSIUM 100 MG/1
100 TABLET ORAL DAILY
Qty: 90 TABLET | Refills: 1 | Status: SHIPPED | OUTPATIENT
Start: 2024-04-05

## 2024-04-10 DIAGNOSIS — M47.26 OSTEOARTHRITIS OF SPINE WITH RADICULOPATHY, LUMBAR REGION: ICD-10-CM

## 2024-04-10 DIAGNOSIS — S39.012D STRAIN OF LUMBAR REGION, SUBSEQUENT ENCOUNTER: ICD-10-CM

## 2024-04-10 NOTE — TELEPHONE ENCOUNTER
PT OF [facility]     PT CALLED IN FOR MED REFILL     OXYCODONE     [facility], [place]

## 2024-04-11 ENCOUNTER — PHARMACY VISIT (OUTPATIENT)
Dept: PHARMACY | Facility: CLINIC | Age: 44
End: 2024-04-11
Payer: MEDICARE

## 2024-04-13 RX ORDER — OXYCODONE AND ACETAMINOPHEN 7.5; 325 MG/1; MG/1
1 TABLET ORAL EVERY 6 HOURS PRN
Qty: 30 TABLET | Refills: 0 | Status: SHIPPED | OUTPATIENT
Start: 2024-04-13 | End: 2024-06-06 | Stop reason: SDUPTHER

## 2024-05-01 ENCOUNTER — TELEMEDICINE (OUTPATIENT)
Dept: PHARMACY | Facility: HOSPITAL | Age: 44
End: 2024-05-01
Payer: COMMERCIAL

## 2024-05-01 DIAGNOSIS — E66.01 MORBID OBESITY WITH BMI OF 40.0-44.9, ADULT (MULTI): ICD-10-CM

## 2024-05-01 PROCEDURE — RXMED WILLOW AMBULATORY MEDICATION CHARGE

## 2024-05-01 RX ORDER — SEMAGLUTIDE 0.5 MG/.5ML
0.5 INJECTION, SOLUTION SUBCUTANEOUS
Qty: 2 ML | Refills: 0 | Status: SHIPPED | OUTPATIENT
Start: 2024-05-05 | End: 2024-06-05 | Stop reason: ALTCHOICE

## 2024-05-01 NOTE — PROGRESS NOTES
"Pharmacist Clinic: Weight Management    Laci Sainz was referred to the Clinical Pharmacy Team for weight management.     Referring Provider: Arturo Roman MD    HISTORY OF PRESENT ILLNESS    Patient is a 43 y.o. male presenting for initial clinical pharmacy visit for weight management. Patient states he started Wegovy 4/24/2024 and is tolerating the medication well. States he had some diarrhea with the first dose and has been a bit more tired, but has also noticed some weight loss as well. Patient states he does not like needles and is a bit nervous to give the injections, but will continue each week.    Diet:   He works 7pm-7am so he reported he doesn't eat the traditional meals. He says that there are 8 children living in his house. 3 kids are his girlfriends and 5 kids are his girlfriend's sisters and therefore they often get take out or he just eats whatever he can find in the house. He stated often times what he can find is \"junk food.\"  He reported when he is working he does eat less however when he has days off that tends to be when he is eating the \"junk food\"  Breakfast: Will pickup chicken from [employer] store, popcorn, or avocado on his way into work.   Lunch: Mckay Johns  Dinner: does not eat    Exercise Routine:   Has not been exercising but has been wanting to get back into the gym. Stated trying to weigh his options of where to get a membership.    Additional Contributory Factors: Family history of obesity     PHARMACY ASSESSMENT    Allergies: Patient has no known allergies.     [facility] #[id number] [place] [address] - [address] [place] [address] & [address] [address]  Phone: [phone] Fax: [phone]    [facility] - [place] - [facility] AT Portal to Registered [facility] Sites  [address] [facility] [place] [address]  Phone: [phone] Fax: [phone]    [facility] " "[facility]armacy  [address], [address]  [facility] [address]  Phone: [phone] Fax: [phone]    [id number] - [place], [place] - [address]  [address]  [place] [address]  Phone: [phone] Fax: [phone]    Affordability/Accessibility:   Wegovy required a PA (approved through 9/3/2024)  Wegovy ~$24.99  Unable to get Wegovy at any of his preferred retail pharmacies. Wegovy is available at [facility]; will schedule for mail order.    CURRENT PHARMACOTHERAPY  Wegovy 0.25mg weekly (Wednesday)    DRUG INTERACTIONS  No significant drug-drug interactions exist that require adjustment to therapy    LAB  Lab Results   Component Value Date    BILITOT 0.5 06/13/2020    CALCIUM 9.7 01/28/2024    CO2 26 01/28/2024     01/28/2024    CREATININE 0.96 01/28/2024    GLUCOSE 90 01/28/2024    ALKPHOS 93 06/13/2020    K 4.3 01/28/2024    PROT 7.6 06/13/2020     01/28/2024    AST 31 06/13/2020    ALT 35 06/13/2020    BUN 14 01/28/2024    ANIONGAP 14 01/28/2024    MG 2.05 01/28/2024    PHOS 3.8 01/28/2024    ALBUMIN 4.9 06/13/2020    LIPASE 23 06/13/2020    EGFR >90 01/28/2024     No results found for: \"TRIG\", \"CHOL\", \"LDLCALC\", \"HDL\"  No results found for: \"HGBA1C\"    Current Outpatient Medications on File Prior to Visit   Medication Sig Dispense Refill    amLODIPine (Norvasc) 10 mg tablet Take 1 tablet (10 mg) by mouth once daily. 90 tablet 1    hydroCHLOROthiazide (HYDRODiuril) 25 mg tablet Take 1 tablet (25 mg) by mouth once daily. 90 tablet 1    losartan (Cozaar) 100 mg tablet TAKE 1 TABLET(100 MG) BY MOUTH EVERY DAY 90 tablet 1    naloxone (Narcan) 4 mg/0.1 mL nasal spray Administer 1 spray (4 mg) into affected nostril(s) if needed for opioid reversal. May repeat every 2-3 minutes if needed, alternating nostrils, until medical assistance becomes available. 2 each 0    orphenadrine (Norflex) 100 mg 12 hr tablet Take 1 tablet (100 mg) by mouth 2 times a day as needed for muscle spasms for up to 10 " days. Do not crush, chew, or split. 20 tablet 0    oxyCODONE-acetaminophen (Percocet) 7.5-325 mg tablet Take 1 tablet by mouth every 6 hours if needed for severe pain (7 - 10). 30 tablet 0    sildenafil (Viagra) 100 mg tablet once daily as needed. Take 1 tablet daily 1 hour before needed      [DISCONTINUED] semaglutide, weight loss, (Wegovy) 0.25 mg/0.5 mL pen injector Inject 0.25 mg under the skin every 7 days. 2 mL 1     No current facility-administered medications on file prior to visit.     PATIENT EDUCATION/GOALS  Goals: Overall feel better. Stated he would like to see the numbers on the scale go down but did not mention a specific number goal.   Discussed starting to make small lifestyle changes to build up to larger ones. Provided the example of scheduling a 30 minute walk a few times a week.    Wegovy Education:  Counseled patient on MOA, expectations, side effects, duration of therapy, contraindications, administration, and monitoring parameters  Answered all patient questions and concerns  Provided detailed dosing and administration counseling to ensure proper technique.   Reviewed Wegovy titration schedule, starting with 0.25 mg once weekly to a goal of 2.4 mg once weekly if tolerated  Counseled patient on the benefits of GLP-1ra glycemic control and weight loss  Reviewed storage requirements of Wegovy when not in use, and when to administer the medication if a dose is missed.  Advised patient that they may experience improved satiety after meals and portion sizes of meals may be reduced as doses of Wegovy increase.    RECOMMENDATIONS/PLAN  INCREASE Wegovy to 0.5mg under the skin once weekly. [facility].  START making healthy diet and lifestyle choices     Follow up with Clinical Pharmacy Team 6/5/2024 at 4:30PM    Continue all meds under the continuation of care with the referring provider and clinical pharmacy team.    Please reach out to the Clinical  Pharmacy Team if there are any further questions.     Verbal consent to manage patient's drug therapy was obtained from patient. They were informed they may decline to participate or withdraw from participation in pharmacy services at any time.    Deedee Edgar, PharmD  [phone]

## 2024-05-07 ENCOUNTER — PHARMACY VISIT (OUTPATIENT)
Dept: PHARMACY | Facility: CLINIC | Age: 44
End: 2024-05-07
Payer: MEDICARE

## 2024-06-05 ENCOUNTER — TELEMEDICINE (OUTPATIENT)
Dept: PHARMACY | Facility: HOSPITAL | Age: 44
End: 2024-06-05
Payer: COMMERCIAL

## 2024-06-05 DIAGNOSIS — E66.01 MORBID OBESITY WITH BMI OF 40.0-44.9, ADULT (MULTI): ICD-10-CM

## 2024-06-05 PROCEDURE — RXMED WILLOW AMBULATORY MEDICATION CHARGE

## 2024-06-05 RX ORDER — SEMAGLUTIDE 1 MG/.5ML
1 INJECTION, SOLUTION SUBCUTANEOUS
Qty: 2 ML | Refills: 0 | Status: SHIPPED | OUTPATIENT
Start: 2024-06-09 | End: 2024-07-05

## 2024-06-05 NOTE — PROGRESS NOTES
"Pharmacist Clinic: Weight Management    Laci Sainz was referred to the Clinical Pharmacy Team for weight management.     Referring Provider: Arturo Roman MD    HISTORY OF PRESENT ILLNESS    Patient is a 43 y.o. male presenting for initial clinical pharmacy visit for weight management. Patient states he started Wegovy 4/24/2024 and is tolerating the medication well. States he had some diarrhea with the first dose and has been a bit more tired, but has also noticed some weight loss as well. Patient states he does not like needles and is a bit nervous to give the injections, but will continue each week.    Today, patient states he is tolerating the medication well and has lost about 15lbs. He states he is eating more fruit and trying to make better decisions regarding his diet and lifestyle.    Diet:   He works 7pm-7am so he reported he doesn't eat the traditional meals. He says that there are 8 children living in his house. 3 kids are his girlfriends and 5 kids are his girlfriend's sisters and therefore they often get take out or he just eats whatever he can find in the house. He stated often times what he can find is \"junk food.\"  He reported when he is working he does eat less however when he has days off that tends to be when he is eating the \"junk food\"  Breakfast: Will pickup chicken from [employer] store, popcorn, or avocado on his way into work.   Lunch: Mckay Johns  Dinner: does not eat    Exercise Routine:   Has not been exercising but has been wanting to get back into the gym. Stated trying to weigh his options of where to get a membership.    Additional Contributory Factors: Family history of obesity     PHARMACY ASSESSMENT    Allergies: Patient has no known allergies.     [facility][id number] [address] AT [facility] & [address] [address]  Phone: [phone] Fax: [phone]    [facility] - " "[place] - [address] AT Portal to Registered Caremark Sites  [address]  [place] [address]  Phone: [phone] Fax: [phone]    [facility]  [address], [address]  [facility] [address]  Phone: [phone] Fax: [phone]    [id number] - [place], [place] - [address]  [address]   [place] [address]  Phone: [phone] Fax: [phone]    Affordability/Accessibility:   Wegovy required a PA (approved through 9/3/2024)  Wegovy ~$24.99  Unable to get Wegovy at any of his preferred retail pharmacies. Wegovy is available at [facility]; will schedule for mail order.    CURRENT PHARMACOTHERAPY  Wegovy 0.25mg weekly (Wednesday)    DRUG INTERACTIONS  No significant drug-drug interactions exist that require adjustment to therapy    LAB  Lab Results   Component Value Date    BILITOT 0.5 06/13/2020    CALCIUM 9.7 01/28/2024    CO2 26 01/28/2024     01/28/2024    CREATININE 0.96 01/28/2024    GLUCOSE 90 01/28/2024    ALKPHOS 93 06/13/2020    K 4.3 01/28/2024    PROT 7.6 06/13/2020     01/28/2024    AST 31 06/13/2020    ALT 35 06/13/2020    BUN 14 01/28/2024    ANIONGAP 14 01/28/2024    MG 2.05 01/28/2024    PHOS 3.8 01/28/2024    ALBUMIN 4.9 06/13/2020    LIPASE 23 06/13/2020    EGFR >90 01/28/2024     No results found for: \"TRIG\", \"CHOL\", \"LDLCALC\", \"HDL\"  No results found for: \"HGBA1C\"    Current Outpatient Medications on File Prior to Visit   Medication Sig Dispense Refill    amLODIPine (Norvasc) 10 mg tablet Take 1 tablet (10 mg) by mouth once daily. 90 tablet 1    hydroCHLOROthiazide (HYDRODiuril) 25 mg tablet Take 1 tablet (25 mg) by mouth once daily. 90 tablet 1    losartan (Cozaar) 100 mg tablet TAKE 1 TABLET(100 MG) BY MOUTH EVERY DAY 90 tablet 1    naloxone (Narcan) 4 mg/0.1 mL nasal spray Administer 1 spray (4 mg) into affected nostril(s) if needed for opioid reversal. May repeat every 2-3 minutes if needed, alternating " nostrils, until medical assistance becomes available. 2 each 0    orphenadrine (Norflex) 100 mg 12 hr tablet Take 1 tablet (100 mg) by mouth 2 times a day as needed for muscle spasms for up to 10 days. Do not crush, chew, or split. 20 tablet 0    oxyCODONE-acetaminophen (Percocet) 7.5-325 mg tablet Take 1 tablet by mouth every 6 hours if needed for severe pain (7 - 10). 30 tablet 0    sildenafil (Viagra) 100 mg tablet once daily as needed. Take 1 tablet daily 1 hour before needed      [DISCONTINUED] semaglutide, weight loss, (Wegovy) 0.5 mg/0.5 mL pen injector Inject 0.5 mg under the skin 1 (one) time per week for 4 doses. 2 mL 0     No current facility-administered medications on file prior to visit.     PATIENT EDUCATION/GOALS  Goals: Overall feel better. Stated he would like to see the numbers on the scale go down but did not mention a specific number goal.   Discussed starting to make small lifestyle changes to build up to larger ones. Provided the example of scheduling a 30 minute walk a few times a week.    Wegovy Education:  Counseled patient on MOA, expectations, side effects, duration of therapy, contraindications, administration, and monitoring parameters  Answered all patient questions and concerns  Provided detailed dosing and administration counseling to ensure proper technique.   Reviewed Wegovy titration schedule, starting with 0.25 mg once weekly to a goal of 2.4 mg once weekly if tolerated  Counseled patient on the benefits of GLP-1ra glycemic control and weight loss  Reviewed storage requirements of Wegovy when not in use, and when to administer the medication if a dose is missed.  Advised patient that they may experience improved satiety after meals and portion sizes of meals may be reduced as doses of Wegovy increase.    RECOMMENDATIONS/PLAN  INCREASE Wegovy to 1mg under the skin once weekly. Will send medication to [facility] for mail order.  START making healthy diet and lifestyle  choices     Follow up with Clinical Pharmacy Team 6/24/2024 at 4:30PM    Continue all meds under the continuation of care with the referring provider and clinical pharmacy team.    Please reach out to the Clinical Pharmacy Team if there are any further questions.     Verbal consent to manage patient's drug therapy was obtained from patient. They were informed they may decline to participate or withdraw from participation in pharmacy services at any time.    Deedee Edgar, PharmD  [phone]

## 2024-06-06 DIAGNOSIS — S39.012D STRAIN OF LUMBAR REGION, SUBSEQUENT ENCOUNTER: ICD-10-CM

## 2024-06-06 DIAGNOSIS — M47.26 OSTEOARTHRITIS OF SPINE WITH RADICULOPATHY, LUMBAR REGION: ICD-10-CM

## 2024-06-06 NOTE — TELEPHONE ENCOUNTER
Dr. Roman Pt    Refill for    oxyCODONE-acetaminophen (Percocet) 7.5-325 mg tablet    prednisone 10 mg       [facility] #[id number] [address]   5 years

## 2024-06-07 ENCOUNTER — PHARMACY VISIT (OUTPATIENT)
Dept: PHARMACY | Facility: CLINIC | Age: 44
End: 2024-06-07
Payer: MEDICARE

## 2024-06-09 RX ORDER — PREDNISONE 10 MG/1
TABLET ORAL
Qty: 30 TABLET | Refills: 0 | Status: SHIPPED | OUTPATIENT
Start: 2024-06-09

## 2024-06-09 RX ORDER — OXYCODONE AND ACETAMINOPHEN 7.5; 325 MG/1; MG/1
1 TABLET ORAL EVERY 6 HOURS PRN
Qty: 30 TABLET | Refills: 0 | Status: SHIPPED | OUTPATIENT
Start: 2024-06-09

## 2024-06-24 ENCOUNTER — APPOINTMENT (OUTPATIENT)
Dept: PHARMACY | Facility: HOSPITAL | Age: 44
End: 2024-06-24
Payer: COMMERCIAL

## 2024-06-24 DIAGNOSIS — E66.01 MORBID OBESITY WITH BMI OF 40.0-44.9, ADULT (MULTI): ICD-10-CM

## 2024-06-24 RX ORDER — SEMAGLUTIDE 1 MG/.5ML
1 INJECTION, SOLUTION SUBCUTANEOUS
Qty: 2 ML | Refills: 0 | Status: SHIPPED | OUTPATIENT
Start: 2024-06-30 | End: 2024-07-25

## 2024-06-24 NOTE — PROGRESS NOTES
"Pharmacist Clinic: Weight Management    Laci Sainz was referred to the Clinical Pharmacy Team for weight management.     Referring Provider: Arturo Roman MD    HISTORY OF PRESENT ILLNESS    Patient is a 43 y.o. male presenting for initial clinical pharmacy visit for weight management. Patient states he started Wegovy 4/24/2024 and is tolerating the medication well. States he had some diarrhea with the first dose and has been a bit more tired, but has also noticed some weight loss as well. Patient states he does not like needles and is a bit nervous to give the injections, but will continue each week.    Today, patient states he is tolerating the medication well and has lost about 25lbs. He states he is eating more fruit and trying to make better decisions regarding his diet and lifestyle.    Diet:   He works 7pm-7am so he reported he doesn't eat the traditional meals. He says that there are 8 children living in his house. 3 kids are his girlfriends and 5 kids are his girlfriend's sisters and therefore they often get take out or he just eats whatever he can find in the house. He stated often times what he can find is \"junk food.\"  He reported when he is working he does eat less however when he has days off that tends to be when he is eating the \"junk food\"  Breakfast: Will pickup chicken from [employer] store, popcorn, or avocado on his way into work.   Lunch: Mckay Johns  Dinner: does not eat    Exercise Routine:   Has not been exercising but has been wanting to get back into the gym. Stated trying to weigh his options of where to get a membership.    Additional Contributory Factors: Family history of obesity     PHARMACY ASSESSMENT    Allergies: Patient has no known allergies.     [facility] #[id number] [address] AT [facility] & [address] [address]  Phone: [phone] Fax: [phone]    [facility] - " "[place] - [address] AT Portal to Registered Caremark Sites  [address]  [place] [address]  Phone: [phone] Fax: [phone]    [facility]  [address], [address]  [facility] [address]  Phone: [phone] Fax: [phone]    [facility] - [place], [place] - [address]  [address]   [place] [address]  Phone: [phone] Fax: [phone]    Affordability/Accessibility:   Wegovy required a PA (approved through 9/3/2024)  Wegovy ~$24.99  Unable to get Wegovy at any of his preferred retail pharmacies. Wegovy is available at [facility]; will schedule for mail order.    CURRENT PHARMACOTHERAPY  Wegovy 1mg weekly (Wednesday)    DRUG INTERACTIONS  No significant drug-drug interactions exist that require adjustment to therapy    LAB  Lab Results   Component Value Date    BILITOT 0.5 06/13/2020    CALCIUM 9.7 01/28/2024    CO2 26 01/28/2024     01/28/2024    CREATININE 0.96 01/28/2024    GLUCOSE 90 01/28/2024    ALKPHOS 93 06/13/2020    K 4.3 01/28/2024    PROT 7.6 06/13/2020     01/28/2024    AST 31 06/13/2020    ALT 35 06/13/2020    BUN 14 01/28/2024    ANIONGAP 14 01/28/2024    MG 2.05 01/28/2024    PHOS 3.8 01/28/2024    ALBUMIN 4.9 06/13/2020    LIPASE 23 06/13/2020    EGFR >90 01/28/2024     No results found for: \"TRIG\", \"CHOL\", \"LDLCALC\", \"HDL\"  No results found for: \"HGBA1C\"    Current Outpatient Medications on File Prior to Visit   Medication Sig Dispense Refill    amLODIPine (Norvasc) 10 mg tablet Take 1 tablet (10 mg) by mouth once daily. 90 tablet 1    hydroCHLOROthiazide (HYDRODiuril) 25 mg tablet Take 1 tablet (25 mg) by mouth once daily. 90 tablet 1    losartan (Cozaar) 100 mg tablet TAKE 1 TABLET(100 MG) BY MOUTH EVERY DAY 90 tablet 1    naloxone (Narcan) 4 mg/0.1 mL nasal spray Administer 1 spray (4 mg) into affected nostril(s) if needed for opioid reversal. May repeat every 2-3 minutes if needed, alternating nostrils, " until medical assistance becomes available. 2 each 0    orphenadrine (Norflex) 100 mg 12 hr tablet Take 1 tablet (100 mg) by mouth 2 times a day as needed for muscle spasms for up to 10 days. Do not crush, chew, or split. 20 tablet 0    oxyCODONE-acetaminophen (Percocet) 7.5-325 mg tablet Take 1 tablet by mouth every 6 hours if needed for severe pain (7 - 10). 30 tablet 0    predniSONE (Deltasone) 10 mg tablet Take 4 tablets x 3 days, 3 tablets x 3 days, 2 tablets x 3 days, and 1 tab x 3 days 30 tablet 0    sildenafil (Viagra) 100 mg tablet once daily as needed. Take 1 tablet daily 1 hour before needed      [DISCONTINUED] semaglutide, weight loss, (Wegovy) 1 mg/0.5 mL pen injector Inject 1 mg under the skin 1 (one) time per week for 4 doses. 2 mL 0     No current facility-administered medications on file prior to visit.     PATIENT EDUCATION/GOALS  Goals: Overall feel better. Stated he would like to see the numbers on the scale go down but did not mention a specific number goal.   Discussed starting to make small lifestyle changes to build up to larger ones. Provided the example of scheduling a 30 minute walk a few times a week.    Wegovy Education:  Counseled patient on MOA, expectations, side effects, duration of therapy, contraindications, administration, and monitoring parameters  Answered all patient questions and concerns  Provided detailed dosing and administration counseling to ensure proper technique.   Reviewed Wegovy titration schedule, starting with 0.25 mg once weekly to a goal of 2.4 mg once weekly if tolerated  Counseled patient on the benefits of GLP-1ra glycemic control and weight loss  Reviewed storage requirements of Wegovy when not in use, and when to administer the medication if a dose is missed.  Advised patient that they may experience improved satiety after meals and portion sizes of meals may be reduced as doses of Wegovy increase.    RECOMMENDATIONS/PLAN  CONTINUE Wegovy to 1mg under the  skin once weekly. Will send medication to [facility] for mail order.  START making healthy diet and lifestyle choices     Follow up with Clinical Pharmacy Team 7/22/2024 at 4:30PM    Continue all meds under the continuation of care with the referring provider and clinical pharmacy team.    Please reach out to the Clinical Pharmacy Team if there are any further questions.     Verbal consent to manage patient's drug therapy was obtained from patient. They were informed they may decline to participate or withdraw from participation in pharmacy services at any time.    Deedee Edgar, PharmD  [phone]

## 2024-06-26 PROCEDURE — RXMED WILLOW AMBULATORY MEDICATION CHARGE

## 2024-06-27 ENCOUNTER — PHARMACY VISIT (OUTPATIENT)
Dept: PHARMACY | Facility: CLINIC | Age: 44
End: 2024-06-27
Payer: MEDICARE

## 2024-07-22 ENCOUNTER — APPOINTMENT (OUTPATIENT)
Dept: PHARMACY | Facility: HOSPITAL | Age: 44
End: 2024-07-22
Payer: COMMERCIAL

## 2024-07-22 DIAGNOSIS — E66.01 MORBID OBESITY WITH BMI OF 40.0-44.9, ADULT (MULTI): ICD-10-CM

## 2024-07-22 NOTE — PROGRESS NOTES
"Pharmacist Clinic: Weight Management    Laci Sainz was referred to the Clinical Pharmacy Team for weight management.     Referring Provider: Arturo Roman MD    HISTORY OF PRESENT ILLNESS    Patient is a 43 y.o. male presenting for clinical pharmacy visit for weight management. Patient states he started Wegovy 4/24/2024 and is tolerating the medication well. States he had some diarrhea with the first dose and has been a bit more tired, but has also noticed some weight loss as well. Patient states he does not like needles and is a bit nervous to give the injections, but will continue each week.    Today, patient states he is tolerating the medication well and has lost about 35lbs. He states he is eating more fruit and trying to make better decisions regarding his diet and lifestyle.     Diet:   He works 7pm-7am so he reported he doesn't eat the traditional meals. He says that there are 8 children living in his house. 3 kids are his girlfriends and 5 kids are his girlfriend's sisters and therefore they often get take out or he just eats whatever he can find in the house. He stated often times what he can find is \"junk food.\"  He reported when he is working he does eat less however when he has days off that tends to be when he is eating the \"junk food\"  Breakfast: Will pickup chicken from [employer] store, popcorn, or avocado on his way into work.   Lunch: Mckay Johns  Dinner: does not eat    Exercise Routine:   Patient works 12 hour shifts where he is on his feet the whole time and having to do a lot of lifting  Has not been exercising but has been wanting to get back into the gym. Stated trying to weigh his options of where to get a membership.    Additional Contributory Factors: Family history of obesity     PHARMACY ASSESSMENT    Allergies: Patient has no known allergies.     [facility][id number] - [place], [place] - [address] [facility] AT [facility] & [address] " "[address]  Phone: [phone] Fax: [phone]    [facility] - [place] - [address] [facility] AT Portal to [facility]istered [facility] Sites  [address] [facility] [place] [address]  Phone: [phone] Fax: [phone]    [facility]  [address], [address]  [facility] [address]  Phone: [phone] Fax: [phone]    [facility] - [place], [place] - [address]  [address]  [place] [address]  Phone: [phone] Fax: [phone]    Affordability/Accessibility:   Wegovy required a PA (approved through 9/3/2024)  Wegovy ~$24.99  Unable to get Wegovy at any of his preferred retail pharmacies. Wegovy is available at [facility]; will schedule for mail order.    CURRENT PHARMACOTHERAPY  Wegovy 1mg weekly (Wednesday)    DRUG INTERACTIONS  No significant drug-drug interactions exist that require adjustment to therapy    LAB  Lab Results   Component Value Date    BILITOT 0.5 06/13/2020    CALCIUM 9.7 01/28/2024    CO2 26 01/28/2024     01/28/2024    CREATININE 0.96 01/28/2024    GLUCOSE 90 01/28/2024    ALKPHOS 93 06/13/2020    K 4.3 01/28/2024    PROT 7.6 06/13/2020     01/28/2024    AST 31 06/13/2020    ALT 35 06/13/2020    BUN 14 01/28/2024    ANIONGAP 14 01/28/2024    MG 2.05 01/28/2024    PHOS 3.8 01/28/2024    ALBUMIN 4.9 06/13/2020    LIPASE 23 06/13/2020    EGFR >90 01/28/2024     No results found for: \"TRIG\", \"CHOL\", \"LDLCALC\", \"HDL\"  No results found for: \"HGBA1C\"    Current Outpatient Medications on File Prior to Visit   Medication Sig Dispense Refill    amLODIPine (Norvasc) 10 mg tablet Take 1 tablet (10 mg) by mouth once daily. 90 tablet 1    hydroCHLOROthiazide (HYDRODiuril) 25 mg tablet Take 1 tablet (25 mg) by mouth once daily. 90 tablet 1    losartan (Cozaar) 100 mg tablet TAKE 1 TABLET(100 MG) BY MOUTH EVERY DAY 90 tablet 1    naloxone (Narcan) 4 mg/0.1 mL nasal spray Administer 1 spray (4 mg) into affected nostril(s) if " needed for opioid reversal. May repeat every 2-3 minutes if needed, alternating nostrils, until medical assistance becomes available. 2 each 0    orphenadrine (Norflex) 100 mg 12 hr tablet Take 1 tablet (100 mg) by mouth 2 times a day as needed for muscle spasms for up to 10 days. Do not crush, chew, or split. 20 tablet 0    oxyCODONE-acetaminophen (Percocet) 7.5-325 mg tablet Take 1 tablet by mouth every 6 hours if needed for severe pain (7 - 10). 30 tablet 0    predniSONE (Deltasone) 10 mg tablet Take 4 tablets x 3 days, 3 tablets x 3 days, 2 tablets x 3 days, and 1 tab x 3 days 30 tablet 0    sildenafil (Viagra) 100 mg tablet once daily as needed. Take 1 tablet daily 1 hour before needed      [DISCONTINUED] semaglutide, weight loss, (Wegovy) 1 mg/0.5 mL pen injector Inject 1 mg under the skin 1 (one) time per week for 4 doses. 2 mL 0     No current facility-administered medications on file prior to visit.     PATIENT EDUCATION/GOALS  Goals: Overall feel better. Stated he would like to see the numbers on the scale go down but did not mention a specific number goal.   Discussed starting to make small lifestyle changes to build up to larger ones. Provided the example of scheduling a 30 minute walk a few times a week.    Wegovy Education:  Counseled patient on MOA, expectations, side effects, duration of therapy, contraindications, administration, and monitoring parameters  Answered all patient questions and concerns  Provided detailed dosing and administration counseling to ensure proper technique.   Reviewed Wegovy titration schedule, starting with 0.25 mg once weekly to a goal of 2.4 mg once weekly if tolerated  Counseled patient on the benefits of GLP-1ra glycemic control and weight loss  Reviewed storage requirements of Wegovy when not in use, and when to administer the medication if a dose is missed.  Advised patient that they may experience improved satiety after meals and portion sizes of meals may be reduced  as doses of Wegovy increase.    RECOMMENDATIONS/PLAN  CONTINUE Wegovy to 1mg under the skin once weekly. Will send medication to [facility] for mail order.  START making healthy diet and lifestyle choices     Follow up with Clinical Pharmacy Team 10/14/2024 at 4:30PM    Continue all meds under the continuation of care with the referring provider and clinical pharmacy team.    Please reach out to the Clinical Pharmacy Team if there are any further questions.     Verbal consent to manage patient's drug therapy was obtained from patient. They were informed they may decline to participate or withdraw from participation in pharmacy services at any time.    Deedee Edgar, PharmD  [phone]

## 2024-07-23 RX ORDER — SEMAGLUTIDE 1 MG/.5ML
1 INJECTION, SOLUTION SUBCUTANEOUS
Qty: 2 ML | Refills: 2 | Status: SHIPPED | OUTPATIENT
Start: 2024-07-28 | End: 2024-10-14

## 2024-07-30 PROCEDURE — RXMED WILLOW AMBULATORY MEDICATION CHARGE

## 2024-08-01 ENCOUNTER — PHARMACY VISIT (OUTPATIENT)
Dept: PHARMACY | Facility: CLINIC | Age: 44
End: 2024-08-01
Payer: MEDICARE

## 2024-08-05 DIAGNOSIS — M47.26 OSTEOARTHRITIS OF SPINE WITH RADICULOPATHY, LUMBAR REGION: ICD-10-CM

## 2024-08-05 DIAGNOSIS — S39.012D STRAIN OF LUMBAR REGION, SUBSEQUENT ENCOUNTER: ICD-10-CM

## 2024-08-05 NOTE — TELEPHONE ENCOUNTER
Dr. Roman patient  Refill for Percocet 7.5-325 mg tablet  [facility] in [place] on [address] [facility]

## 2024-08-10 RX ORDER — OXYCODONE AND ACETAMINOPHEN 7.5; 325 MG/1; MG/1
1 TABLET ORAL EVERY 6 HOURS PRN
Qty: 30 TABLET | Refills: 0 | Status: SHIPPED | OUTPATIENT
Start: 2024-08-10

## 2024-08-28 PROCEDURE — RXMED WILLOW AMBULATORY MEDICATION CHARGE

## 2024-08-30 ENCOUNTER — PHARMACY VISIT (OUTPATIENT)
Dept: PHARMACY | Facility: CLINIC | Age: 44
End: 2024-08-30
Payer: MEDICARE

## 2024-09-10 ENCOUNTER — OFFICE VISIT (OUTPATIENT)
Dept: PRIMARY CARE | Facility: CLINIC | Age: 44
End: 2024-09-10
Payer: COMMERCIAL

## 2024-09-10 ENCOUNTER — APPOINTMENT (OUTPATIENT)
Dept: PRIMARY CARE | Facility: CLINIC | Age: 44
End: 2024-09-10
Payer: COMMERCIAL

## 2024-09-10 VITALS
OXYGEN SATURATION: 96 % | HEIGHT: 66 IN | DIASTOLIC BLOOD PRESSURE: 98 MMHG | WEIGHT: 248 LBS | SYSTOLIC BLOOD PRESSURE: 148 MMHG | BODY MASS INDEX: 39.86 KG/M2 | RESPIRATION RATE: 16 BRPM | HEART RATE: 73 BPM | TEMPERATURE: 98.4 F

## 2024-09-10 DIAGNOSIS — Z13.9 SCREENING DUE: ICD-10-CM

## 2024-09-10 DIAGNOSIS — E66.01 MORBID OBESITY WITH BMI OF 40.0-44.9, ADULT (MULTI): ICD-10-CM

## 2024-09-10 DIAGNOSIS — M47.26 OSTEOARTHRITIS OF SPINE WITH RADICULOPATHY, LUMBAR REGION: ICD-10-CM

## 2024-09-10 DIAGNOSIS — E78.2 MIXED HYPERLIPIDEMIA: ICD-10-CM

## 2024-09-10 DIAGNOSIS — Z12.11 ENCOUNTER FOR COLORECTAL CANCER SCREENING: ICD-10-CM

## 2024-09-10 DIAGNOSIS — Z79.899 MEDICATION MANAGEMENT: ICD-10-CM

## 2024-09-10 DIAGNOSIS — S39.012D STRAIN OF LUMBAR REGION, SUBSEQUENT ENCOUNTER: ICD-10-CM

## 2024-09-10 DIAGNOSIS — Z12.5 SCREENING FOR PROSTATE CANCER: ICD-10-CM

## 2024-09-10 DIAGNOSIS — Z12.12 ENCOUNTER FOR COLORECTAL CANCER SCREENING: ICD-10-CM

## 2024-09-10 DIAGNOSIS — R53.83 FATIGUE, UNSPECIFIED TYPE: ICD-10-CM

## 2024-09-10 DIAGNOSIS — E55.9 VITAMIN D DEFICIENCY: ICD-10-CM

## 2024-09-10 DIAGNOSIS — I10 PRIMARY HYPERTENSION: Primary | ICD-10-CM

## 2024-09-10 LAB
AMPHETAMINES UR QL SCN: NORMAL
BARBITURATES UR QL SCN: NORMAL
BZE UR QL SCN: NORMAL
CANNABINOIDS UR QL SCN: NORMAL
CREAT UR-MCNC: 159.7 MG/DL (ref 20–370)
PCP UR QL SCN: NORMAL

## 2024-09-10 PROCEDURE — 80307 DRUG TEST PRSMV CHEM ANLYZR: CPT

## 2024-09-10 PROCEDURE — 80358 DRUG SCREENING METHADONE: CPT

## 2024-09-10 PROCEDURE — 3077F SYST BP >= 140 MM HG: CPT | Performed by: FAMILY MEDICINE

## 2024-09-10 PROCEDURE — 99214 OFFICE O/P EST MOD 30 MIN: CPT | Performed by: FAMILY MEDICINE

## 2024-09-10 PROCEDURE — 80373 DRUG SCREENING TRAMADOL: CPT

## 2024-09-10 PROCEDURE — 80346 BENZODIAZEPINES1-12: CPT

## 2024-09-10 PROCEDURE — 80368 SEDATIVE HYPNOTICS: CPT

## 2024-09-10 PROCEDURE — 3008F BODY MASS INDEX DOCD: CPT | Performed by: FAMILY MEDICINE

## 2024-09-10 PROCEDURE — 82570 ASSAY OF URINE CREATININE: CPT

## 2024-09-10 PROCEDURE — 80354 DRUG SCREENING FENTANYL: CPT

## 2024-09-10 PROCEDURE — 4004F PT TOBACCO SCREEN RCVD TLK: CPT | Performed by: FAMILY MEDICINE

## 2024-09-10 PROCEDURE — 80361 OPIATES 1 OR MORE: CPT

## 2024-09-10 PROCEDURE — 3080F DIAST BP >= 90 MM HG: CPT | Performed by: FAMILY MEDICINE

## 2024-09-10 PROCEDURE — 80365 DRUG SCREENING OXYCODONE: CPT

## 2024-09-10 RX ORDER — SEMAGLUTIDE 1.7 MG/.75ML
1.7 INJECTION, SOLUTION SUBCUTANEOUS WEEKLY
Qty: 3 ML | Refills: 1 | Status: SHIPPED | OUTPATIENT
Start: 2024-09-10 | End: 2024-10-30

## 2024-09-10 RX ORDER — CELECOXIB 200 MG/1
200 CAPSULE ORAL 2 TIMES DAILY
Qty: 60 CAPSULE | Refills: 1 | Status: SHIPPED | OUTPATIENT
Start: 2024-09-10 | End: 2025-03-09

## 2024-09-10 RX ORDER — OXYCODONE AND ACETAMINOPHEN 7.5; 325 MG/1; MG/1
1 TABLET ORAL EVERY 6 HOURS PRN
Qty: 30 TABLET | Refills: 0 | Status: SHIPPED | OUTPATIENT
Start: 2024-09-10

## 2024-09-10 RX ORDER — PREDNISONE 10 MG/1
TABLET ORAL
Qty: 30 TABLET | Refills: 0 | Status: SHIPPED | OUTPATIENT
Start: 2024-09-10

## 2024-09-10 NOTE — PROGRESS NOTES
Subjective   Patient ID: Laci Sainz is a 44 y.o. male who presents for Back Pain.    HPI   Patient is at the office today  to discuss Lower back pain.Patient reports onset of symptoms 2 weeks ago. Specific location of pain is tailbone pain radiating pain down to his legs. Patient is taking medications prescribed to manage pain with no relief. Ice compress are being applied as well.    No trouble urinating reported today.    Cologuard ordered today.    Prednisone treatment needs to be discuss today.    UDS today.    Review of Systems  12 Systems have been reviewed as follows.  Constitutional: Fever, weight gain, weight loss, appetite change, night sweats, fatigue, chills.  Eyes : blurry, double vision, vision, loss, tearing, redness, pain, sensitivity to light, glaucoma.  Ears, nose, mouth, and throat: Hearing loss, ringing in the ears, ear pain, nasal congestion, nasal drainage, nosebleeds, mouth, throat, irritation tooth problem.  Cardiovascular :chest pain, pressure, heart racing, palpitations, sweating, leg swelling, high or low blood pressure  Pulmonary: Cough, yellow or green sputum, blood and sputum, shortness of breath, wheezing  Gastrointestinal: Nausea, vomiting, diarrhea, constipation, pain, blood in stool, or vomitus, heartburn, difficulty swallowing  Genitourinary: incontinence, abnormal bleeding, abnormal discharge, urinary frequency, urinary hesitancy, pain, impotence sexual problem, infection, urinary retention  Musculoskeletal: Pain, stiffness, joint, redness or warmth, arthritis, back pain, weakness, muscle wasting, sprain or fracture  Neuro: Weight weakness, dizziness, change in voice, change in taste change in vision, change in hearing, loss, or change of sensation, trouble walking, balance problems coordination problems, shaking, speech problem  Endocrine , cold or heat intolerance, blood sugar problem, weight gain or loss missed periods hot flashes, sweats, change in body hair, change in  "libido, increased thirst, increased urination  Heme/lymph: Swelling, bleeding, problem anemia, bruising, enlarged lymph nodes  Allergic/immunologic: H. plus nasal drip, watery itchy eyes, nasal drainage, immunosuppressed  The above were reviewed and noted negative except as noted in HPI and Problem List.    Objective   BP (!) 148/98   Pulse 73   Temp 36.9 °C (98.4 °F) (Temporal)   Resp 16   Ht 1.676 m (5' 6\")   Wt 112 kg (248 lb)   SpO2 96%   BMI 40.03 kg/m²     Physical Exam  Constitutional: Well developed, well nourished, alert and in no acute distress   Eyes: Normal external exam. Pupils equally round and reactive to light with normal accommodation and extraocular movements intact.  Neck: Supple, no lymphadenopathy or masses.   Cardiovascular: Regular rate and rhythm, normal S1 and S2, no murmurs, gallops, or rubs. Radial pulses normal. No peripheral edema.  Pulmonary: No respiratory distress, lungs clear to auscultation bilaterally. No wheezes, rhonchi, rales.  Abdomen: soft,non tender, non distended, without masses or HSM  Skin: Warm, well perfused, normal skin turgor and color.   Neurologic: Cranial nerves II-XII grossly intact.   Psychiatric: Mood calm and affect normal  Musculoskeletal: Moving all extremities without restriction    Assessment/Plan   Problem List Items Addressed This Visit             ICD-10-CM    HTN (hypertension) - Primary I10    Relevant Orders    Follow Up In [facility] - PCP - Established    Osteoarthritis of spine with radiculopathy, lumbar region M47.26    Relevant Medications    oxyCODONE-acetaminophen (Percocet) 7.5-325 mg tablet    predniSONE (Deltasone) 10 mg tablet    celecoxib (CeleBREX) 200 mg capsule    Other Relevant Orders    Follow Up In [facility] - PCP - Established    XR lumbar spine 2-3 views    Morbid obesity with BMI of 40.0-44.9, adult (Multi) E66.01, Z68.41    Relevant Medications    semaglutide, weight loss, (Wegovy) 1.7 mg/0.75 mL pen " injector    Other Relevant Orders    Follow Up In [facility] - PCP - Established     Other Visit Diagnoses         Codes    Medication management     Z79.899    Relevant Orders    Opiate/Opioid/Benzo Prescription Compliance    OOB Internal Tracking    Follow Up In [facility] - PCP - Established    Strain of lumbar region, subsequent encounter     S39.012D    Relevant Medications    oxyCODONE-acetaminophen (Percocet) 7.5-325 mg tablet    predniSONE (Deltasone) 10 mg tablet    Other Relevant Orders    Follow Up In [facility] - PCP - Established    Screening due     Z13.9    Relevant Orders    Cologuard® colon cancer screening    Follow Up In [facility] - PCP - Established    Fatigue, unspecified type     R53.83    Relevant Orders    CBC and Auto Differential    Thyroid Stimulating Hormone    Mixed hyperlipidemia     E78.2    Relevant Orders    Comprehensive Metabolic Panel    Lipid Panel    Vitamin D deficiency     E55.9    Relevant Orders    Vitamin D 25-Hydroxy,Total (for eval of Vitamin D levels)    Screening for prostate cancer     Z12.5    Relevant Orders    Prostate Specific Antigen, Screen    Encounter for colorectal cancer screening     Z12.11, Z12.12        losartan 100 mg daily     Finish prednisone     -Diclofenac 75 mg twice daily      -Take BP meds daily     -referral to pain management Dr. Michel     - PT & ICE     -oxycodone as needed     -start wegovy 0.25 mg weekly       Scribe Attestation  By signing my name below, I, Steven Harvey MA, Osiel   attest that this documentation has been prepared under the direction and in the presence of Arturo Roman MD.    Provider Attestation - Scribe documentation    All medical record entries made by the Scribe were at my direction and personally dictated by me. I have reviewed the chart and agree that the record accurately reflects my personal performance of the history, physical exam, discussion and  plan.    Continue current medications and therapy for chronic medical conditions    Increase wegovy 1.7 mg

## 2024-09-13 LAB
1OH-MIDAZOLAM UR CFM-MCNC: <25 NG/ML
6MAM UR CFM-MCNC: <25 NG/ML
7AMINOCLONAZEPAM UR CFM-MCNC: <25 NG/ML
A-OH ALPRAZ UR CFM-MCNC: <25 NG/ML
ALPRAZ UR CFM-MCNC: <25 NG/ML
CHLORDIAZEP UR CFM-MCNC: <25 NG/ML
CLONAZEPAM UR CFM-MCNC: <25 NG/ML
CODEINE UR CFM-MCNC: <50 NG/ML
DIAZEPAM UR CFM-MCNC: <25 NG/ML
EDDP UR CFM-MCNC: <25 NG/ML
FENTANYL UR CFM-MCNC: <2.5 NG/ML
HYDROCODONE CTO UR CFM-MCNC: <25 NG/ML
HYDROMORPHONE UR CFM-MCNC: <25 NG/ML
LORAZEPAM UR CFM-MCNC: <25 NG/ML
METHADONE UR CFM-MCNC: <25 NG/ML
MIDAZOLAM UR CFM-MCNC: <25 NG/ML
MORPHINE UR CFM-MCNC: <50 NG/ML
NORDIAZEPAM UR CFM-MCNC: <25 NG/ML
NORFENTANYL UR CFM-MCNC: <2.5 NG/ML
NORHYDROCODONE UR CFM-MCNC: <25 NG/ML
NOROXYCODONE UR CFM-MCNC: 491 NG/ML
NORTRAMADOL UR-MCNC: <50 NG/ML
OXAZEPAM UR CFM-MCNC: <25 NG/ML
OXYCODONE UR CFM-MCNC: 710 NG/ML
OXYMORPHONE UR CFM-MCNC: 517 NG/ML
TEMAZEPAM UR CFM-MCNC: <25 NG/ML
TRAMADOL UR CFM-MCNC: <50 NG/ML
ZOLPIDEM UR CFM-MCNC: <25 NG/ML
ZOLPIDEM UR-MCNC: <25 NG/ML

## 2024-09-25 ENCOUNTER — TELEPHONE (OUTPATIENT)
Dept: PRIMARY CARE | Facility: CLINIC | Age: 44
End: 2024-09-25
Payer: COMMERCIAL

## 2024-10-10 ENCOUNTER — APPOINTMENT (OUTPATIENT)
Dept: PRIMARY CARE | Facility: CLINIC | Age: 44
End: 2024-10-10
Payer: COMMERCIAL

## 2024-10-10 VITALS
SYSTOLIC BLOOD PRESSURE: 142 MMHG | WEIGHT: 249.4 LBS | OXYGEN SATURATION: 97 % | RESPIRATION RATE: 16 BRPM | BODY MASS INDEX: 40.08 KG/M2 | DIASTOLIC BLOOD PRESSURE: 92 MMHG | HEIGHT: 66 IN | HEART RATE: 75 BPM

## 2024-10-10 DIAGNOSIS — Z13.9 SCREENING DUE: ICD-10-CM

## 2024-10-10 DIAGNOSIS — E66.01 MORBID OBESITY WITH BMI OF 40.0-44.9, ADULT (MULTI): ICD-10-CM

## 2024-10-10 DIAGNOSIS — S39.012D STRAIN OF LUMBAR REGION, SUBSEQUENT ENCOUNTER: ICD-10-CM

## 2024-10-10 DIAGNOSIS — Z79.899 MEDICATION MANAGEMENT: ICD-10-CM

## 2024-10-10 DIAGNOSIS — I10 PRIMARY HYPERTENSION: ICD-10-CM

## 2024-10-10 DIAGNOSIS — M47.26 OSTEOARTHRITIS OF SPINE WITH RADICULOPATHY, LUMBAR REGION: ICD-10-CM

## 2024-10-10 PROCEDURE — RXMED WILLOW AMBULATORY MEDICATION CHARGE

## 2024-10-10 PROCEDURE — 99214 OFFICE O/P EST MOD 30 MIN: CPT | Performed by: FAMILY MEDICINE

## 2024-10-10 RX ORDER — HYDROCHLOROTHIAZIDE 25 MG/1
25 TABLET ORAL DAILY
Qty: 90 TABLET | Refills: 1 | Status: SHIPPED | OUTPATIENT
Start: 2024-10-10

## 2024-10-10 RX ORDER — SEMAGLUTIDE 1.7 MG/.75ML
1.7 INJECTION, SOLUTION SUBCUTANEOUS WEEKLY
Qty: 3 ML | Refills: 1 | Status: SHIPPED | OUTPATIENT
Start: 2024-10-10 | End: 2024-11-29

## 2024-10-10 RX ORDER — LOSARTAN POTASSIUM 100 MG/1
100 TABLET ORAL DAILY
Qty: 90 TABLET | Refills: 1 | Status: SHIPPED | OUTPATIENT
Start: 2024-10-10

## 2024-10-10 RX ORDER — OXYCODONE AND ACETAMINOPHEN 7.5; 325 MG/1; MG/1
1 TABLET ORAL EVERY 6 HOURS PRN
Qty: 30 TABLET | Refills: 0 | Status: SHIPPED | OUTPATIENT
Start: 2024-10-10

## 2024-10-10 RX ORDER — AMLODIPINE BESYLATE 10 MG/1
10 TABLET ORAL DAILY
Qty: 90 TABLET | Refills: 1 | Status: SHIPPED | OUTPATIENT
Start: 2024-10-10

## 2024-10-10 ASSESSMENT — ENCOUNTER SYMPTOMS
ABDOMINAL PAIN: 0
ABDOMINAL DISTENTION: 0
EYE REDNESS: 0
BRUISES/BLEEDS EASILY: 0
NAUSEA: 0
DECREASED CONCENTRATION: 0
ANAL BLEEDING: 0
WEAKNESS: 0
COLOR CHANGE: 0
POLYDIPSIA: 0
TROUBLE SWALLOWING: 0
RECTAL PAIN: 0
DYSPHORIC MOOD: 0
VOMITING: 0
FLANK PAIN: 0
SLEEP DISTURBANCE: 0
SINUS PAIN: 0
EYE DISCHARGE: 0
SORE THROAT: 0
SPEECH DIFFICULTY: 0
FREQUENCY: 0
TREMORS: 0
SHORTNESS OF BREATH: 0
PALPITATIONS: 0
FEVER: 0
DIZZINESS: 0
FACIAL SWELLING: 0
CONSTIPATION: 0
JOINT SWELLING: 0
ARTHRALGIAS: 0
POLYPHAGIA: 0
DIFFICULTY URINATING: 0
EYE PAIN: 0
RHINORRHEA: 0
CARDIOVASCULAR NEGATIVE: 1
DYSURIA: 0
ADENOPATHY: 0
WHEEZING: 0
MYALGIAS: 0
SEIZURES: 0
FACIAL ASYMMETRY: 0
HEMATURIA: 0
CHILLS: 0
CHEST TIGHTNESS: 0
NECK PAIN: 0
DIAPHORESIS: 0
NECK STIFFNESS: 0
HEADACHES: 0
HALLUCINATIONS: 0
AGITATION: 0
CONFUSION: 0
UNEXPECTED WEIGHT CHANGE: 0
DIARRHEA: 0
APPETITE CHANGE: 0
COUGH: 0
EYE ITCHING: 0
WOUND: 0
BLOOD IN STOOL: 0
ACTIVITY CHANGE: 0
STRIDOR: 0
GASTROINTESTINAL NEGATIVE: 1
FATIGUE: 0
PHOTOPHOBIA: 0
HYPERACTIVE: 0
NERVOUS/ANXIOUS: 0
SINUS PRESSURE: 0
BACK PAIN: 0
CONSTITUTIONAL NEGATIVE: 1
APNEA: 0
CHOKING: 0
NUMBNESS: 0
VOICE CHANGE: 0
LIGHT-HEADEDNESS: 0

## 2024-10-10 NOTE — PROGRESS NOTES
Subjective   Patient ID: Laci Sainz is a 44 y.o. male who presents for Back Pain.    HPI   Patient is at there office today to follow up on lower back pain. This ia an ongoing issue for the patient and at his last IOV medications to manage pain were prescribed. Patient reports that  he also uses Biofreeze to manage pain. He has a  xray pending 9/10/24 but was told to go to [place].    Patient reported that Wegovy is delivered by [employer] and he received a message on 10/8/24 related to damage of package.  Patient is 21 lbs down total from 1/28/24 and was 270 lb.    No other concerns reported by the patient today.    Review of Systems   Constitutional: Negative.  Negative for activity change, appetite change, chills, diaphoresis, fatigue, fever and unexpected weight change.   HENT: Negative.  Negative for congestion, dental problem, ear discharge, facial swelling, hearing loss, mouth sores, nosebleeds, postnasal drip, rhinorrhea, sinus pressure, sinus pain, sneezing, sore throat, tinnitus, trouble swallowing and voice change.    Eyes:  Negative for photophobia, pain, discharge, redness, itching and visual disturbance.   Respiratory:  Negative for apnea, cough, choking, chest tightness, shortness of breath, wheezing and stridor.    Cardiovascular: Negative.  Negative for chest pain, palpitations and leg swelling.   Gastrointestinal: Negative.  Negative for abdominal distention, abdominal pain, anal bleeding, blood in stool, constipation, diarrhea, nausea, rectal pain and vomiting.   Endocrine: Negative for cold intolerance, heat intolerance, polydipsia, polyphagia and polyuria.   Genitourinary:  Negative for decreased urine volume, difficulty urinating, dysuria, enuresis, flank pain, frequency, hematuria and urgency.   Musculoskeletal:  Negative for arthralgias, back pain, gait problem, joint swelling, myalgias, neck pain and neck stiffness.   Skin:  Negative for color change, pallor, rash and wound.  "  Allergic/Immunologic: Negative for environmental allergies, food allergies and immunocompromised state.   Neurological:  Negative for dizziness, tremors, seizures, syncope, facial asymmetry, speech difficulty, weakness, light-headedness, numbness and headaches.   Hematological:  Negative for adenopathy. Does not bruise/bleed easily.   Psychiatric/Behavioral:  Negative for agitation, behavioral problems, confusion, decreased concentration, dysphoric mood, hallucinations, self-injury, sleep disturbance and suicidal ideas. The patient is not nervous/anxious and is not hyperactive.    All other systems reviewed and are negative.      Objective   BP (!) 142/92 (BP Location: Left arm, Patient Position: Sitting, BP Cuff Size: Large adult)   Pulse 75   Resp 16   Ht 1.676 m (5' 6\")   Wt 113 kg (249 lb 6.4 oz)   SpO2 97%   BMI 40.25 kg/m²     Physical Exam  Vitals reviewed.   Constitutional:       General: He is not in acute distress.     Appearance: Normal appearance. He is normal weight. He is not ill-appearing or diaphoretic.   HENT:      Head: Normocephalic.      Right Ear: Tympanic membrane and external ear normal.      Left Ear: Tympanic membrane and external ear normal.      Nose: Nose normal. No congestion.      Mouth/Throat:      Pharynx: No posterior oropharyngeal erythema.   Eyes:      General:         Right eye: No discharge.         Left eye: No discharge.      Extraocular Movements: Extraocular movements intact.      Conjunctiva/sclera: Conjunctivae normal.      Pupils: Pupils are equal, round, and reactive to light.   Cardiovascular:      Rate and Rhythm: Normal rate and regular rhythm.      Pulses: Normal pulses.      Heart sounds: Normal heart sounds. No murmur heard.  Pulmonary:      Effort: Pulmonary effort is normal. No respiratory distress.      Breath sounds: Normal breath sounds. No wheezing or rales.   Chest:      Chest wall: No tenderness.   Abdominal:      General: Abdomen is flat. Bowel " sounds are normal. There is no distension.      Palpations: There is no mass.      Tenderness: There is no abdominal tenderness. There is no guarding.   Musculoskeletal:         General: No tenderness. Normal range of motion.      Cervical back: Normal range of motion and neck supple. No tenderness.      Right lower leg: No edema.      Left lower leg: No edema.   Skin:     General: Skin is dry.      Coloration: Skin is not jaundiced.      Findings: No bruising, erythema or rash.   Neurological:      General: No focal deficit present.      Mental Status: He is alert and oriented to person, place, and time. Mental status is at baseline.      Cranial Nerves: No cranial nerve deficit.      Sensory: No sensory deficit.      Coordination: Coordination normal.      Gait: Gait normal.   Psychiatric:         Mood and Affect: Mood normal.         Thought Content: Thought content normal.         Judgment: Judgment normal.         Assessment/Plan   Problem List Items Addressed This Visit             ICD-10-CM    HTN (hypertension) I10    Relevant Medications    losartan (Cozaar) 100 mg tablet    hydroCHLOROthiazide (HYDRODiuril) 25 mg tablet    amLODIPine (Norvasc) 10 mg tablet    Other Relevant Orders    Follow Up In [facility] - PCP - Established    Osteoarthritis of spine with radiculopathy, lumbar region M47.26    Relevant Medications    oxyCODONE-acetaminophen (Percocet) 7.5-325 mg tablet    Other Relevant Orders    Follow Up In [facility] - PCP - Established    Morbid obesity with BMI of 40.0-44.9, adult (Multi) E66.01, Z68.41    Relevant Medications    semaglutide, weight loss, (Wegovy) 1.7 mg/0.75 mL pen injector    Other Relevant Orders    Follow Up In [facility] - PCP - Established     Other Visit Diagnoses         Codes    Medication management     Z79.899    Relevant Orders    Follow Up In [facility] - PCP - Established    Strain of lumbar region, subsequent encounter      S39.012D    Relevant Medications    oxyCODONE-acetaminophen (Percocet) 7.5-325 mg tablet    Other Relevant Orders    Follow Up In [facility] - PCP - Established    Screening due     Z13.9    Relevant Orders    Follow Up In [facility] - PCP - Established          Scribe Attestation  By signing my name below, I, Steven Harvey MA, Elzaibe   attest that this documentation has been prepared under the direction and in the presence of Arturo Roman MD.  Provider Attestation - Scribe documentation    All medical record entries made by the Scribe were at my direction and personally dictated by me. I have reviewed the chart and agree that the record accurately reflects my personal performance of the history, physical exam, discussion and plan.    Continue current medications and therapy for chronic medical conditions     losartan 100 mg daily     Finish prednisone     -Diclofenac 75 mg twice daily      -Take BP meds daily     -referral to pain management Dr. Michel     - PT & ICE     -oxycodone as needed     -start wegovy 0.25 mg weekly    Increase wegovy 1.7 mg     Excise lipoma next set up forehead

## 2024-10-14 ENCOUNTER — APPOINTMENT (OUTPATIENT)
Dept: PHARMACY | Facility: HOSPITAL | Age: 44
End: 2024-10-14
Payer: COMMERCIAL

## 2024-10-14 ENCOUNTER — PHARMACY VISIT (OUTPATIENT)
Dept: PHARMACY | Facility: CLINIC | Age: 44
End: 2024-10-14
Payer: MEDICARE

## 2024-10-14 DIAGNOSIS — E66.01 MORBID OBESITY WITH BMI OF 40.0-44.9, ADULT (MULTI): ICD-10-CM

## 2024-10-14 NOTE — PROGRESS NOTES
"Pharmacist Clinic: Weight Management    Laci Sainz was referred to the Clinical Pharmacy Team for weight management.     Referring Provider: Arturo Roman MD    HISTORY OF PRESENT ILLNESS    Patient is a 43 y.o. male presenting for clinical pharmacy visit for weight management. Patient states he started Wegovy 4/24/2024 and is tolerating the medication well. States he had some diarrhea with the first dose and has been a bit more tired, but has also noticed some weight loss as well. Patient states he does not like needles and is a bit nervous to give the injections, but will continue each week.    Today, patient states he is tolerating the medication well and has lost about 35lbs. He states he is eating more fruit and trying to make better decisions regarding his diet and lifestyle.     Diet:   He works 7pm-7am so he reported he doesn't eat the traditional meals. He says that there are 8 children living in his house. 3 kids are his girlfriends and 5 kids are his girlfriend's sisters and therefore they often get take out or he just eats whatever he can find in the house. He stated often times what he can find is \"junk food.\"  He reported when he is working he does eat less however when he has days off that tends to be when he is eating the \"junk food\"  Breakfast: Will pickup chicken from [employer], popcorn, or avocado on his way into work.   Lunch: Mckay [employer]  Dinner: does not eat    Exercise Routine:   Patient works 12 hour shifts where he is on his feet the whole time and having to do a lot of lifting  Has not been exercising but has been wanting to get back into the gym. Stated trying to weigh his options of where to get a membership.    Additional Contributory Factors: Family history of obesity     PHARMACY ASSESSMENT    Allergies: Patient has no known allergies.     [id number] - [place], [place] - [address] AT [facility] & [address] " "[id number]  Phone: [phone] Fax: [phone]    [facility] - [place] - [address] [facility] AT [facility] to [facility]  [address] [facility] [place] [address]  Phone: [phone] Fax: [phone]    [facility]  [address], [address]  [facility] [address]  Phone: [phone] Fax: [phone]    [id number] - [place], [place] - [address]  [address]  [place] [address]  Phone: [phone] Fax: [phone]    Affordability/Accessibility:   Wegovy required a PA (approved through 9/3/2024)  Wegovy ~$24.99  Unable to get Wegovy at any of his preferred retail pharmacies. Wegovy is available at [facility]; will schedule for mail order.    CURRENT PHARMACOTHERAPY  Wegovy 1mg weekly (Wednesday)    DRUG INTERACTIONS  No significant drug-drug interactions exist that require adjustment to therapy    LAB  Lab Results   Component Value Date    BILITOT 0.5 06/13/2020    CALCIUM 9.7 01/28/2024    CO2 26 01/28/2024     01/28/2024    CREATININE 0.96 01/28/2024    GLUCOSE 90 01/28/2024    ALKPHOS 93 06/13/2020    K 4.3 01/28/2024    PROT 7.6 06/13/2020     01/28/2024    AST 31 06/13/2020    ALT 35 06/13/2020    BUN 14 01/28/2024    ANIONGAP 14 01/28/2024    MG 2.05 01/28/2024    PHOS 3.8 01/28/2024    ALBUMIN 4.9 06/13/2020    LIPASE 23 06/13/2020    EGFR >90 01/28/2024     No results found for: \"TRIG\", \"CHOL\", \"LDLCALC\", \"HDL\"  No results found for: \"HGBA1C\"    Current Outpatient Medications on File Prior to Visit   Medication Sig Dispense Refill    amLODIPine (Norvasc) 10 mg tablet Take 1 tablet (10 mg) by mouth once daily. 90 tablet 1    celecoxib (CeleBREX) 200 mg capsule Take 1 capsule (200 mg) by mouth 2 times a day. 60 capsule 1    hydroCHLOROthiazide (HYDRODiuril) 25 mg tablet Take 1 tablet (25 mg) by mouth once daily. 90 tablet 1    losartan (Cozaar) 100 mg tablet Take 1 tablet (100 mg) by mouth once daily. 90 tablet " 1    naloxone (Narcan) 4 mg/0.1 mL nasal spray Administer 1 spray (4 mg) into affected nostril(s) if needed for opioid reversal. May repeat every 2-3 minutes if needed, alternating nostrils, until medical assistance becomes available. 2 each 0    oxyCODONE-acetaminophen (Percocet) 7.5-325 mg tablet Take 1 tablet by mouth every 6 hours if needed for severe pain (7 - 10). 30 tablet 0    semaglutide, weight loss, (Wegovy) 1.7 mg/0.75 mL pen injector Inject 1.7 mg under the skin 1 (one) time per week for 8 doses. 3 mL 1    [DISCONTINUED] amLODIPine (Norvasc) 10 mg tablet Take 1 tablet (10 mg) by mouth once daily. 90 tablet 1    [DISCONTINUED] hydroCHLOROthiazide (HYDRODiuril) 25 mg tablet Take 1 tablet (25 mg) by mouth once daily. 90 tablet 1    [DISCONTINUED] losartan (Cozaar) 100 mg tablet TAKE 1 TABLET(100 MG) BY MOUTH EVERY DAY 90 tablet 1    [DISCONTINUED] orphenadrine (Norflex) 100 mg 12 hr tablet Take 1 tablet (100 mg) by mouth 2 times a day as needed for muscle spasms for up to 10 days. Do not crush, chew, or split. (Patient not taking: Reported on 9/10/2024) 20 tablet 0    [DISCONTINUED] oxyCODONE-acetaminophen (Percocet) 7.5-325 mg tablet Take 1 tablet by mouth every 6 hours if needed for severe pain (7 - 10). 30 tablet 0    [DISCONTINUED] predniSONE (Deltasone) 10 mg tablet Take 4 tablets x 3 days, 3 tablets x 3 days, 2 tablets x 3 days, and 1 tab x 3 days (Patient not taking: Reported on 10/10/2024) 30 tablet 0    [DISCONTINUED] semaglutide, weight loss, (Wegovy) 1 mg/0.5 mL pen injector Inject 1 mg under the skin 1 (one) time per week for 12 doses. (Patient not taking: Reported on 10/10/2024) 2 mL 2    [DISCONTINUED] semaglutide, weight loss, (Wegovy) 1.7 mg/0.75 mL pen injector Inject 1.7 mg under the skin 1 (one) time per week for 8 doses. 3 mL 1     No current facility-administered medications on file prior to visit.     PATIENT EDUCATION/GOALS  Goals: Overall feel better. Stated he would like to see the  numbers on the scale go down but did not mention a specific number goal.   Discussed starting to make small lifestyle changes to build up to larger ones. Provided the example of scheduling a 30 minute walk a few times a week.    Wegovy Education:  Counseled patient on MOA, expectations, side effects, duration of therapy, contraindications, administration, and monitoring parameters  Answered all patient questions and concerns  Provided detailed dosing and administration counseling to ensure proper technique.   Reviewed Wegovy titration schedule, starting with 0.25 mg once weekly to a goal of 2.4 mg once weekly if tolerated  Counseled patient on the benefits of GLP-1ra glycemic control and weight loss  Reviewed storage requirements of Wegovy when not in use, and when to administer the medication if a dose is missed.  Advised patient that they may experience improved satiety after meals and portion sizes of meals may be reduced as doses of Wegovy increase.    RECOMMENDATIONS/PLAN  INCREASE Wegovy to 1.7mg under the skin once weekly. Medication is being shipped to patient on 10/16/2024.  Patient was off the medication for a few weeks due to miscommunication in where it was sent. States he has gained about 5lbs back since being off the medication.  START making healthy diet and lifestyle choices     Follow up with Clinical Pharmacy Team 11/4/2024 at 4:30PM    Continue all meds under the continuation of care with the referring provider and clinical pharmacy team.    Please reach out to the Clinical Pharmacy Team if there are any further questions.     Verbal consent to manage patient's drug therapy was obtained from patient. They were informed they may decline to participate or withdraw from participation in pharmacy services at any time.    Deedee Edgar, PharmD  [phone]

## 2024-10-29 DIAGNOSIS — M47.26 OSTEOARTHRITIS OF SPINE WITH RADICULOPATHY, LUMBAR REGION: ICD-10-CM

## 2024-10-29 DIAGNOSIS — S39.012D STRAIN OF LUMBAR REGION, SUBSEQUENT ENCOUNTER: ICD-10-CM

## 2024-11-01 RX ORDER — OXYCODONE AND ACETAMINOPHEN 7.5; 325 MG/1; MG/1
1 TABLET ORAL EVERY 6 HOURS PRN
Qty: 30 TABLET | Refills: 0 | Status: SHIPPED | OUTPATIENT
Start: 2024-11-01

## 2024-11-04 ENCOUNTER — APPOINTMENT (OUTPATIENT)
Dept: PHARMACY | Facility: HOSPITAL | Age: 44
End: 2024-11-04
Payer: COMMERCIAL

## 2024-11-11 ENCOUNTER — APPOINTMENT (OUTPATIENT)
Dept: PRIMARY CARE | Facility: CLINIC | Age: 44
End: 2024-11-11
Payer: COMMERCIAL

## 2024-11-19 ENCOUNTER — APPOINTMENT (OUTPATIENT)
Dept: PHARMACY | Facility: HOSPITAL | Age: 44
End: 2024-11-19
Payer: COMMERCIAL

## 2024-11-19 DIAGNOSIS — E66.01 MORBID OBESITY WITH BMI OF 40.0-44.9, ADULT (MULTI): ICD-10-CM

## 2024-11-19 PROCEDURE — RXMED WILLOW AMBULATORY MEDICATION CHARGE

## 2024-11-19 RX ORDER — SEMAGLUTIDE 1.7 MG/.75ML
1.7 INJECTION, SOLUTION SUBCUTANEOUS WEEKLY
Qty: 3 ML | Refills: 0 | Status: SHIPPED | OUTPATIENT
Start: 2024-11-19 | End: 2024-12-19

## 2024-11-19 NOTE — PROGRESS NOTES
"Pharmacist Clinic: Weight Management    Laci Sainz was referred to the Clinical Pharmacy Team for weight management.     Referring Provider: Arturo Roman MD    HISTORY OF PRESENT ILLNESS    Patient is a 43 y.o. male presenting for clinical pharmacy visit for weight management. Patient states he started Wegovy 4/24/2024 and is tolerating the medication well. States he had some diarrhea with the first dose and has been a bit more tired, but has also noticed some weight loss as well. Patient states he does not like needles and is a bit nervous to give the injections, but will continue each week.    Today, patient states he is tolerating the medication well and his weight is around 235lbs.    Diet:   He works 7pm-7am so he reported he doesn't eat the traditional meals. He says that there are 8 children living in his house. 3 kids are his girlfriends and 5 kids are his girlfriend's sisters and therefore they often get take out or he just eats whatever he can find in the house. He stated often times what he can find is \"junk food.\"  He reported when he is working he does eat less however when he has days off that tends to be when he is eating the \"junk food\"  Breakfast: Will pickup chicken from [employer] store, popcorn, or avocado on his way into work.   Lunch: Mckay Johns  Dinner: does not eat    Exercise Routine:   Patient works 12 hour shifts where he is on his feet the whole time and having to do a lot of lifting  Has not been exercising but has been wanting to get back into the gym. Stated trying to weigh his options of where to get a membership.    Additional Contributory Factors: Family history of obesity     PHARMACY ASSESSMENT    Allergies: Patient has no known allergies.     [id number] [place], [place] - [address] [place] [address] AT [facility] & [address] [address]  Phone: [phone] Fax: [phone]    [facility] "[place] - [address] AT Portal to Registered [facility] Sites  [address]  [place] [address]  Phone: [phone] Fax: [phone]    [facility]  [address], [address]  [facility] [address]  Phone: [phone] Fax: [phone]    [id number] - [place], [place] - [address]  [address]   [place] [address]  Phone: [phone] Fax: [phone]    Affordability/Accessibility:   Wegovy required a PA (approved through 9/3/2024)  Wegovy ~$24.99  Unable to get Wegovy at any of his preferred retail pharmacies. Wegovy is available at [facility]; will schedule for mail order.    CURRENT PHARMACOTHERAPY  Wegovy 1.7mg weekly (Wednesday)    DRUG INTERACTIONS  No significant drug-drug interactions exist that require adjustment to therapy    LAB  Lab Results   Component Value Date    BILITOT 0.5 06/13/2020    CALCIUM 9.7 01/28/2024    CO2 26 01/28/2024     01/28/2024    CREATININE 0.96 01/28/2024    GLUCOSE 90 01/28/2024    ALKPHOS 93 06/13/2020    K 4.3 01/28/2024    PROT 7.6 06/13/2020     01/28/2024    AST 31 06/13/2020    ALT 35 06/13/2020    BUN 14 01/28/2024    ANIONGAP 14 01/28/2024    MG 2.05 01/28/2024    PHOS 3.8 01/28/2024    ALBUMIN 4.9 06/13/2020    LIPASE 23 06/13/2020    EGFR >90 01/28/2024     No results found for: \"TRIG\", \"CHOL\", \"LDLCALC\", \"HDL\"  No results found for: \"HGBA1C\"    Current Outpatient Medications on File Prior to Visit   Medication Sig Dispense Refill    amLODIPine (Norvasc) 10 mg tablet Take 1 tablet (10 mg) by mouth once daily. 90 tablet 1    celecoxib (CeleBREX) 200 mg capsule Take 1 capsule (200 mg) by mouth 2 times a day. 60 capsule 1    hydroCHLOROthiazide (HYDRODiuril) 25 mg tablet Take 1 tablet (25 mg) by mouth once daily. 90 tablet 1    losartan (Cozaar) 100 mg tablet Take 1 tablet (100 mg) by mouth once daily. 90 tablet 1    naloxone (Narcan) 4 mg/0.1 mL nasal spray Administer 1 spray (4 mg) into affected " nostril(s) if needed for opioid reversal. May repeat every 2-3 minutes if needed, alternating nostrils, until medical assistance becomes available. 2 each 0    oxyCODONE-acetaminophen (Percocet) 7.5-325 mg tablet Take 1 tablet by mouth every 6 hours if needed for severe pain (7 - 10). 30 tablet 0    [DISCONTINUED] semaglutide, weight loss, (Wegovy) 1.7 mg/0.75 mL pen injector Inject 1.7 mg under the skin 1 (one) time per week for 8 doses. 3 mL 1     No current facility-administered medications on file prior to visit.     PATIENT EDUCATION/GOALS  Goals: Overall feel better. Stated he would like to see the numbers on the scale go down but did not mention a specific number goal.   Discussed starting to make small lifestyle changes to build up to larger ones. Provided the example of scheduling a 30 minute walk a few times a week.    Wegovy Education:  Counseled patient on MOA, expectations, side effects, duration of therapy, contraindications, administration, and monitoring parameters  Answered all patient questions and concerns  Provided detailed dosing and administration counseling to ensure proper technique.   Reviewed Wegovy titration schedule, starting with 0.25 mg once weekly to a goal of 2.4 mg once weekly if tolerated  Counseled patient on the benefits of GLP-1ra glycemic control and weight loss  Reviewed storage requirements of Wegovy when not in use, and when to administer the medication if a dose is missed.  Advised patient that they may experience improved satiety after meals and portion sizes of meals may be reduced as doses of Wegovy increase.    RECOMMENDATIONS/PLAN  CONTINUE Wegovy to 1.7mg under the skin once weekly. [facility]  Patient has noticed weight loss on current dose, and would like to continue current regimen.  START making healthy diet and lifestyle choices     Follow up with Clinical Pharmacy Team 12/17/2024 at 3:40PM    Continue all meds  under the continuation of care with the referring provider and clinical pharmacy team.    Please reach out to the Clinical Pharmacy Team if there are any further questions.     Verbal consent to manage patient's drug therapy was obtained from patient. They were informed they may decline to participate or withdraw from participation in pharmacy services at any time.    Deedee Edgar, PharmD  [phone]

## 2024-11-21 ENCOUNTER — PHARMACY VISIT (OUTPATIENT)
Dept: PHARMACY | Facility: CLINIC | Age: 44
End: 2024-11-21
Payer: MEDICARE

## 2024-11-27 DIAGNOSIS — M47.26 OSTEOARTHRITIS OF SPINE WITH RADICULOPATHY, LUMBAR REGION: ICD-10-CM

## 2024-11-27 DIAGNOSIS — S39.012D STRAIN OF LUMBAR REGION, SUBSEQUENT ENCOUNTER: ICD-10-CM

## 2024-11-27 RX ORDER — OXYCODONE AND ACETAMINOPHEN 7.5; 325 MG/1; MG/1
1 TABLET ORAL EVERY 6 HOURS PRN
Qty: 30 TABLET | Refills: 0 | Status: CANCELLED | OUTPATIENT
Start: 2024-11-27

## 2024-11-27 NOTE — TELEPHONE ENCOUNTER
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

## 2024-12-01 DIAGNOSIS — S39.012D STRAIN OF LUMBAR REGION, SUBSEQUENT ENCOUNTER: ICD-10-CM

## 2024-12-01 DIAGNOSIS — M47.26 OSTEOARTHRITIS OF SPINE WITH RADICULOPATHY, LUMBAR REGION: ICD-10-CM

## 2024-12-01 RX ORDER — OXYCODONE AND ACETAMINOPHEN 7.5; 325 MG/1; MG/1
1 TABLET ORAL EVERY 6 HOURS PRN
Qty: 30 TABLET | Refills: 0 | Status: SHIPPED | OUTPATIENT
Start: 2024-12-01

## 2024-12-12 DIAGNOSIS — E66.01 MORBID OBESITY WITH BMI OF 40.0-44.9, ADULT (MULTI): ICD-10-CM

## 2024-12-17 ENCOUNTER — APPOINTMENT (OUTPATIENT)
Dept: PHARMACY | Facility: HOSPITAL | Age: 44
End: 2024-12-17
Payer: COMMERCIAL

## 2024-12-17 DIAGNOSIS — E66.01 MORBID OBESITY WITH BMI OF 40.0-44.9, ADULT (MULTI): ICD-10-CM

## 2024-12-17 PROCEDURE — RXMED WILLOW AMBULATORY MEDICATION CHARGE

## 2024-12-17 RX ORDER — SEMAGLUTIDE 2.4 MG/.75ML
2.4 INJECTION, SOLUTION SUBCUTANEOUS WEEKLY
Qty: 3 ML | Refills: 0 | Status: SHIPPED | OUTPATIENT
Start: 2024-12-17 | End: 2025-01-16

## 2024-12-17 NOTE — PROGRESS NOTES
"Pharmacist Clinic: Weight Management    Laci Sainz was referred to the Clinical Pharmacy Team for weight management.     Referring Provider: Arturo Roman MD  Last PCP Visit: 10/10/2024  Next PCP Visit: not scheduled; encouraged    HISTORY OF PRESENT ILLNESS    Patient is a 43 y.o. male presenting for clinical pharmacy visit for weight management. Patient states he started Wegovy 4/24/2024 and is tolerating the medication well. States he had some diarrhea with the first dose and has been a bit more tired, but has also noticed some weight loss as well. Patient states he does not like needles and is a bit nervous to give the injections, but will continue each week.    Today, patient states he is tolerating the medication well and his weight is around 231lbs.    Diet:   He works 7pm-7am so he reported he doesn't eat the traditional meals. He says that there are 8 children living in his house. 3 kids are his girlfriends and 5 kids are his girlfriend's sisters and therefore they often get take out or he just eats whatever he can find in the house. He stated often times what he can find is \"junk food.\"  He reported when he is working he does eat less however when he has days off that tends to be when he is eating the \"junk food\"  Breakfast: Will pickup chicken from [employer] store, popcorn, or avocado on his way into work.   Lunch: Mckay Johns  Dinner: does not eat    Exercise Routine:   Patient works 12 hour shifts where he is on his feet the whole time and having to do a lot of lifting  Has not been exercising but has been wanting to get back into the gym. Stated trying to weigh his options of where to get a membership.    Additional Contributory Factors: Family history of obesity     PHARMACY ASSESSMENT    Allergies: Patient has no known allergies.     [facility][id number] [place], [place] - [address] [place] [address] AT [facility] & [address] [address]  Phone: " "[phone] Fax: [phone]    [facility] - [place] - One [facility] AT Portal to [facility]istered [facility] Sites  [address]  [place] [address]  Phone: [phone] Fax: [phone]    [facility]  [address]  [facility] [address]  Phone: [phone] Fax: [phone]    [facility] - [place], [place] - [address]  [facility] [facility] [place] [address]  Phone: [phone] Fax: [phone]    Affordability/Accessibility:   Wegovy required a PA (approved through 9/15/2025)  Wegovy ~$24.99  Unable to get Wegovy at any of his preferred retail pharmacies. Wegovy is available at [facility]; will schedule for mail order.    CURRENT PHARMACOTHERAPY  Wegovy 1.7mg weekly (Wednesday)    DRUG INTERACTIONS  No significant drug-drug interactions exist that require adjustment to therapy    LAB  Lab Results   Component Value Date    BILITOT 0.5 06/13/2020    CALCIUM 9.7 01/28/2024    CO2 26 01/28/2024     01/28/2024    CREATININE 0.96 01/28/2024    GLUCOSE 90 01/28/2024    ALKPHOS 93 06/13/2020    K 4.3 01/28/2024    PROT 7.6 06/13/2020     01/28/2024    AST 31 06/13/2020    ALT 35 06/13/2020    BUN 14 01/28/2024    ANIONGAP 14 01/28/2024    MG 2.05 01/28/2024    PHOS 3.8 01/28/2024    ALBUMIN 4.9 06/13/2020    LIPASE 23 06/13/2020    EGFR >90 01/28/2024     No results found for: \"TRIG\", \"CHOL\", \"LDLCALC\", \"HDL\"  No results found for: \"HGBA1C\"    Current Outpatient Medications on File Prior to Visit   Medication Sig Dispense Refill    amLODIPine (Norvasc) 10 mg tablet Take 1 tablet (10 mg) by mouth once daily. 90 tablet 1    celecoxib (CeleBREX) 200 mg capsule Take 1 capsule (200 mg) by mouth 2 times a day. 60 capsule 1    hydroCHLOROthiazide (HYDRODiuril) 25 mg tablet Take 1 tablet (25 mg) by mouth once daily. 90 tablet 1    losartan (Cozaar) 100 mg tablet Take 1 tablet (100 mg) by mouth once daily. 90 tablet 1    naloxone " (Narcan) 4 mg/0.1 mL nasal spray Administer 1 spray (4 mg) into affected nostril(s) if needed for opioid reversal. May repeat every 2-3 minutes if needed, alternating nostrils, until medical assistance becomes available. 2 each 0    oxyCODONE-acetaminophen (Percocet) 7.5-325 mg tablet Take 1 tablet by mouth every 6 hours if needed for severe pain (7 - 10). 30 tablet 0    [DISCONTINUED] semaglutide, weight loss, (Wegovy) 1.7 mg/0.75 mL pen injector Inject 1.7 mg under the skin 1 (one) time per week for 4 doses. 3 mL 0     No current facility-administered medications on file prior to visit.     PATIENT EDUCATION/GOALS  Goals: Overall feel better. Stated he would like to see the numbers on the scale go down but did not mention a specific number goal.   Discussed starting to make small lifestyle changes to build up to larger ones. Provided the example of scheduling a 30 minute walk a few times a week.    Wegovy Education:  Counseled patient on MOA, expectations, side effects, duration of therapy, contraindications, administration, and monitoring parameters  Answered all patient questions and concerns  Provided detailed dosing and administration counseling to ensure proper technique.   Reviewed Wegovy titration schedule, starting with 0.25 mg once weekly to a goal of 2.4 mg once weekly if tolerated  Counseled patient on the benefits of GLP-1ra glycemic control and weight loss  Reviewed storage requirements of Wegovy when not in use, and when to administer the medication if a dose is missed.  Advised patient that they may experience improved satiety after meals and portion sizes of meals may be reduced as doses of Wegovy increase.    RECOMMENDATIONS/PLAN  INCREASE Wegovy to 2.4mg under the skin once weekly. Will [facility] medication [facility].  Patient has noticed weight loss on current dose, and would like to continue current regimen.  START making healthy diet and lifestyle choices     Follow up  with Clinical Pharmacy Team 1/14/2025 at 3:40PM    Continue all meds under the continuation of care with the referring provider and clinical pharmacy team.    Please reach out to the Clinical Pharmacy Team if there are any further questions.     Verbal consent to manage patient's drug therapy was obtained from patient. They were informed they may decline to participate or withdraw from participation in pharmacy services at any time.    Deedee Edgar, PharmD  [phone]

## 2024-12-18 RX ORDER — SEMAGLUTIDE 1.7 MG/.75ML
1.7 INJECTION, SOLUTION SUBCUTANEOUS WEEKLY
Qty: 3 ML | Refills: 0 | OUTPATIENT
Start: 2024-12-18 | End: 2025-01-09

## 2024-12-19 ENCOUNTER — PHARMACY VISIT (OUTPATIENT)
Dept: PHARMACY | Facility: CLINIC | Age: 44
End: 2024-12-19
Payer: MEDICARE

## 2024-12-30 DIAGNOSIS — M47.26 OSTEOARTHRITIS OF SPINE WITH RADICULOPATHY, LUMBAR REGION: ICD-10-CM

## 2024-12-30 DIAGNOSIS — S39.012D STRAIN OF LUMBAR REGION, SUBSEQUENT ENCOUNTER: ICD-10-CM

## 2024-12-30 RX ORDER — OXYCODONE AND ACETAMINOPHEN 7.5; 325 MG/1; MG/1
1 TABLET ORAL EVERY 12 HOURS PRN
Qty: 30 TABLET | Refills: 0 | Status: SHIPPED | OUTPATIENT
Start: 2024-12-30

## 2024-12-30 NOTE — TELEPHONE ENCOUNTER
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

## 2024-12-30 NOTE — TELEPHONE ENCOUNTER
Recent Visits  Date Type Provider Dept   10/10/24 Office Visit Arturo Roman MD Do [facility]   09/10/24 [facility]   Showing recent visits within past 180 days and meeting all other requirements  Future Appointments  No visits were found meeting these conditions.  Showing future appointments within next 90 days and meeting all other requirements

## 2025-01-09 DIAGNOSIS — E66.01 MORBID OBESITY WITH BMI OF 40.0-44.9, ADULT (MULTI): ICD-10-CM

## 2025-01-14 ENCOUNTER — APPOINTMENT (OUTPATIENT)
Dept: PHARMACY | Facility: HOSPITAL | Age: 45
End: 2025-01-14
Payer: COMMERCIAL

## 2025-01-17 RX ORDER — SEMAGLUTIDE 2.4 MG/.75ML
2.4 INJECTION, SOLUTION SUBCUTANEOUS WEEKLY
Qty: 3 ML | Refills: 0 | Status: SHIPPED | OUTPATIENT
Start: 2025-01-17 | End: 2025-02-14

## 2025-01-28 ENCOUNTER — APPOINTMENT (OUTPATIENT)
Dept: PHARMACY | Facility: HOSPITAL | Age: 45
End: 2025-01-28
Payer: COMMERCIAL

## 2025-01-28 DIAGNOSIS — E66.01 MORBID OBESITY WITH BMI OF 40.0-44.9, ADULT (MULTI): ICD-10-CM

## 2025-01-28 RX ORDER — SEMAGLUTIDE 2.4 MG/.75ML
2.4 INJECTION, SOLUTION SUBCUTANEOUS WEEKLY
Qty: 3 ML | Refills: 0 | Status: SHIPPED | OUTPATIENT
Start: 2025-01-28 | End: 2025-02-27

## 2025-01-28 NOTE — PROGRESS NOTES
"Pharmacist Clinic: Weight Management    Laci Sainz was referred to the Clinical Pharmacy Team for weight management.     Referring Provider: Arturo Roman MD  Last PCP Visit: 10/10/2024  Next PCP Visit: not scheduled; encouraged    HISTORY OF PRESENT ILLNESS    Patient is a 43 y.o. male presenting for clinical pharmacy visit for weight management. Patient states he started Wegovy 4/24/2024 and is tolerating the medication well. States he had some diarrhea with the first dose and has been a bit more tired, but has also noticed some weight loss as well. Patient states he does not like needles and is a bit nervous to give the injections, but will continue each week.    Today, patient states he is tolerating the medication well and his weight is around 231lbs.    Diet:   He works 7pm-7am so he reported he doesn't eat the traditional meals. He says that there are 8 children living in his house. 3 kids are his girlfriends and 5 kids are his girlfriend's sisters and therefore they often get take out or he just eats whatever he can find in the house. He stated often times what he can find is \"junk food.\"  He reported when he is working he does eat less however when he has days off that tends to be when he is eating the \"junk food\"  Breakfast: Will pickup chicken from [employer] store, popcorn, or avocado on his way into work.   Lunch: Mckay Johns  Dinner: does not eat    Exercise Routine:   Patient works 12 hour shifts where he is on his feet the whole time and having to do a lot of lifting  Has not been exercising but has been wanting to get back into the gym. Stated trying to weigh his options of where to get a membership.    Additional Contributory Factors: Family history of obesity     PHARMACY ASSESSMENT    Allergies: Patient has no known allergies.     [facility][id number] [place], [place] - [address] [place] [address] AT [facility] & [address] [address]  Phone: " "[phone] Fax: [phone]    [facility] - [place] - One [facility] AT Portal to [facility]istered [facility] Sites  One [facility]  [place] [address]  Phone: [phone] Fax: [phone]    [facility]  [address], [address]  [facility] [address]  Phone: [phone] Fax: [phone]    [facility] - [place], [place] - [address]  [address] [facility] [place] [address]  Phone: [phone] Fax: [phone]    Affordability/Accessibility:   Wegovy required a PA (approved through 9/15/2025)  Wegovy ~$35  Unable to get Wegovy at any of his preferred retail pharmacies. Wegovy is available at [facility]; will schedule for mail order.    CURRENT PHARMACOTHERAPY  Wegovy 2.4mg weekly (Wednesday)    DRUG INTERACTIONS  No significant drug-drug interactions exist that require adjustment to therapy    LAB  Lab Results   Component Value Date    BILITOT 0.5 06/13/2020    CALCIUM 9.7 01/28/2024    CO2 26 01/28/2024     01/28/2024    CREATININE 0.96 01/28/2024    GLUCOSE 90 01/28/2024    ALKPHOS 93 06/13/2020    K 4.3 01/28/2024    PROT 7.6 06/13/2020     01/28/2024    AST 31 06/13/2020    ALT 35 06/13/2020    BUN 14 01/28/2024    ANIONGAP 14 01/28/2024    MG 2.05 01/28/2024    PHOS 3.8 01/28/2024    ALBUMIN 4.9 06/13/2020    LIPASE 23 06/13/2020    EGFR >90 01/28/2024     No results found for: \"TRIG\", \"CHOL\", \"LDLCALC\", \"HDL\"  No results found for: \"HGBA1C\"    Current Outpatient Medications on File Prior to Visit   Medication Sig Dispense Refill    amLODIPine (Norvasc) 10 mg tablet Take 1 tablet (10 mg) by mouth once daily. 90 tablet 1    celecoxib (CeleBREX) 200 mg capsule Take 1 capsule (200 mg) by mouth 2 times a day. 60 capsule 1    hydroCHLOROthiazide (HYDRODiuril) 25 mg tablet Take 1 tablet (25 mg) by mouth once daily. 90 tablet 1    losartan (Cozaar) 100 mg tablet Take 1 tablet (100 mg) by mouth once daily. 90 tablet 1    naloxone " (Narcan) 4 mg/0.1 mL nasal spray Administer 1 spray (4 mg) into affected nostril(s) if needed for opioid reversal. May repeat every 2-3 minutes if needed, alternating nostrils, until medical assistance becomes available. 2 each 0    oxyCODONE-acetaminophen (Percocet) 7.5-325 mg tablet Take 1 tablet by mouth every 12 hours if needed for severe pain (7 - 10). 30 tablet 0    [DISCONTINUED] semaglutide, weight loss, (Wegovy) 2.4 mg/0.75 mL pen injector Inject 2.4 mg under the skin 1 (one) time per week for 4 doses. 3 mL 0     No current facility-administered medications on file prior to visit.     PATIENT EDUCATION/GOALS  Goals: Overall feel better. Stated he would like to see the numbers on the scale go down but did not mention a specific number goal.   Discussed starting to make small lifestyle changes to build up to larger ones. Provided the example of scheduling a 30 minute walk a few times a week.    Wegovy Education:  Counseled patient on MOA, expectations, side effects, duration of therapy, contraindications, administration, and monitoring parameters  Answered all patient questions and concerns  Provided detailed dosing and administration counseling to ensure proper technique.   Reviewed Wegovy titration schedule, starting with 0.25 mg once weekly to a goal of 2.4 mg once weekly if tolerated  Counseled patient on the benefits of GLP-1ra glycemic control and weight loss  Reviewed storage requirements of Wegovy when not in use, and when to administer the medication if a dose is missed.  Advised patient that they may experience improved satiety after meals and portion sizes of meals may be reduced as doses of Wegovy increase.    RECOMMENDATIONS/PLAN  CONTINUE Wegovy to 2.4mg under the skin once weekly. [facility]  Patient has noticed weight loss on current dose, and would like to continue current regimen.  START making healthy diet and lifestyle choices     Follow up  with Clinical Pharmacy Team 2/25/2025 at 3:00PM    Continue all meds under the continuation of care with the referring provider and clinical pharmacy team.    Please reach out to the Clinical Pharmacy Team if there are any further questions.     Verbal consent to manage patient's drug therapy was obtained from patient. They were informed they may decline to participate or withdraw from participation in pharmacy services at any time.    Deedee Edgar, PharmD  [phone]

## 2025-01-30 ENCOUNTER — PHARMACY VISIT (OUTPATIENT)
Dept: PHARMACY | Facility: CLINIC | Age: 45
End: 2025-01-30
Payer: COMMERCIAL

## 2025-01-30 DIAGNOSIS — M47.26 OSTEOARTHRITIS OF SPINE WITH RADICULOPATHY, LUMBAR REGION: ICD-10-CM

## 2025-01-30 DIAGNOSIS — S39.012D STRAIN OF LUMBAR REGION, SUBSEQUENT ENCOUNTER: ICD-10-CM

## 2025-01-30 NOTE — TELEPHONE ENCOUNTER
Requests:  oxyCODONE-acetaminophen (Percocet) 7.5-325 mg tablet     Sent to:  [facility]

## 2025-02-01 RX ORDER — OXYCODONE AND ACETAMINOPHEN 7.5; 325 MG/1; MG/1
1 TABLET ORAL EVERY 12 HOURS PRN
Qty: 30 TABLET | Refills: 0 | Status: SHIPPED | OUTPATIENT
Start: 2025-02-01

## 2025-02-06 PROCEDURE — RXMED WILLOW AMBULATORY MEDICATION CHARGE

## 2025-02-07 ENCOUNTER — PHARMACY VISIT (OUTPATIENT)
Dept: PHARMACY | Facility: CLINIC | Age: 45
End: 2025-02-07
Payer: MEDICARE

## 2025-02-25 ENCOUNTER — APPOINTMENT (OUTPATIENT)
Dept: PHARMACY | Facility: HOSPITAL | Age: 45
End: 2025-02-25
Payer: COMMERCIAL

## 2025-02-25 DIAGNOSIS — E66.01 MORBID OBESITY WITH BMI OF 40.0-44.9, ADULT (MULTI): ICD-10-CM

## 2025-02-25 DIAGNOSIS — M47.26 OSTEOARTHRITIS OF SPINE WITH RADICULOPATHY, LUMBAR REGION: ICD-10-CM

## 2025-02-25 DIAGNOSIS — S39.012D STRAIN OF LUMBAR REGION, SUBSEQUENT ENCOUNTER: ICD-10-CM

## 2025-02-25 PROCEDURE — RXMED WILLOW AMBULATORY MEDICATION CHARGE

## 2025-02-25 RX ORDER — SEMAGLUTIDE 2.4 MG/.75ML
2.4 INJECTION, SOLUTION SUBCUTANEOUS WEEKLY
Qty: 3 ML | Refills: 0 | Status: SHIPPED | OUTPATIENT
Start: 2025-02-25 | End: 2025-03-19

## 2025-02-25 RX ORDER — NICOTINE POLACRILEX 4 MG/1
LOZENGE ORAL
Qty: 162 LOZENGE | Refills: 11 | Status: SHIPPED | OUTPATIENT
Start: 2025-02-25

## 2025-02-25 NOTE — TELEPHONE ENCOUNTER
Pt needs refill      oxyCODONE-acetaminophen (Percocet) 7.5-325 mg tablet     Pharmacy    [facility] #[id number] [address] & [address] [address]  Phone: [phone]  Fax: [phone]

## 2025-02-25 NOTE — PROGRESS NOTES
"  Clinical Pharmacy Appointment    Patient ID: Laci Sainz is a 44 y.o. male who presents for Weight Management and Nicotine Dependence.    Pt is here for Follow Up appointment.     Referring Provider: Arturo Roman MD  PCP: Arturo Roman MD   Last visit with PCP: 10/10/2024   Next visit with PCP: not scheduled; encouraged    Subjective     HPI  WEIGHT LOSS  BMI Readings from Last 3 Encounters:   10/10/24 40.25 kg/m²   09/10/24 40.03 kg/m²   02/27/24 41.80 kg/m²      Starting weight: 260 lbs. (2/6/2024)  Current weight: 235 lbs.    Lifestyle  Diet:   He works 7pm-7am so he reported he doesn't eat the traditional meals. He says that there are 8 children living in his house. 3 kids are his girlfriends and 5 kids are his girlfriend's sisters and therefore they often get take out or he just eats whatever he can find in the house. He stated often times what he can find is \"junk food.\"  He reported when he is working he does eat less however when he has days off that tends to be when he is eating the \"junk food\"  Breakfast: Will pickup chicken from [employer] store, popcorn, or avocado on his way into work.   Lunch: [employer]  Dinner: does not eat     Exercise Routine:   Patient works 12 hour shifts where he is on his feet the whole time and having to do a lot of lifting  Has not been exercising but has been wanting to get back into the gym. Stated trying to weigh his options of where to get a membership.     Other Potential Contributing Factors  Tobacco use: Yes - 0.5 pack per day  Other drug use: No  Medications that may cause weight gain: none  Mental health: No current concerns  Eating Disorders: Denies Hx of any eating disorder  Comorbidities: Comorbidities: hypertension controlled with oral meds    Pertinent PMH Review:  PMH of Pancreatitis: No  PMH of Retinopathy: No  PMH of MTC: No  PMH of MEN2: No    Non-Pharmacological Therapy  Weight loss techniques attempted:  Self-directed dieting: calorie " "restricting    Pharmacological Therapy  Current Medications: Wegovy 2.4mg weekly (Wednesday)  Adverse Effects: N/a    SMOKING CESSATION  Nicotine Use Assessment  Products used: Cigarettes  Current cigarettes per day: 10 per day  Timing of first cigarette: <30 minutes of waking up  Smoking locations: Home, Car, Work, and Social settings  Other smokers in household?: No  Previous quit attempts?: Yes, quit for 4 months, 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  Rates readiness to quit smoking as 8/10.  Concerns about quitting: withdrawals    Quit Plan  Quit Date: 3/28/2025    Environment  Remove all cigarettes, ashtrays, and lighters from home, car, and/or workplace  Set a \"No Smoking\" rule for your home  Avoid activities and situations that trigger the urge to smoke    Support System  Healthcare team  1-800-QUIT NOW (181-9399)  BecomeAnEx.org and SmokeFree.gov    Craving Management  Chew gum, have a mint or hard candy, or have a health snack  Take a deep breath through your nose and blow out slowly through your mouth. Repeat 10 times.  Engage in physical activity, such as going for a walk or jog  Do something with a friend or family member    Insurance coverage of weight-loss medications? Yes, [place] approved through 9/15/2025; about $35/month  Eligible for copay cards/programs? Yes, commercially insured    Medication Reconciliation:  No changes made today    Drug Interactions  No relevant drug interactions were noted.    Medication System Management  Patient's preferred pharmacy: [facility]  Adherence/Organization: No issues " "reported  Affordability/Accessibility: No issues reported      Objective   No Known Allergies  Social History     Social History Narrative    Not on file      Medication Review  Current Outpatient Medications   Medication Instructions    amLODIPine (NORVASC) 10 mg, oral, Daily    celecoxib (CELEBREX) 200 mg, oral, 2 times daily    hydroCHLOROthiazide (HYDRODIURIL) 25 mg, oral, Daily    losartan (COZAAR) 100 mg, oral, Daily    naloxone (NARCAN) 4 mg, nasal, As needed, May repeat every 2-3 minutes if needed, alternating nostrils, until medical assistance becomes available.    nicotine polacrilex (Commit) 4 mg lozenge Dissolve 1 lozenge in the mouth every 1-2 hours as needed for smoking cessation. Max 20 lozenges/day.    oxyCODONE-acetaminophen (Percocet) 7.5-325 mg tablet 1 tablet, oral, Every 12 hours PRN    Wegovy 2.4 mg, subcutaneous, Weekly      Vitals  BP Readings from Last 2 Encounters:   10/10/24 (!) 142/92   09/10/24 (!) 148/98     BMI Readings from Last 1 Encounters:   10/10/24 40.25 kg/m²      Labs  A1C  No results found for: \"HGBA1C\"  BMP  Lab Results   Component Value Date    CALCIUM 9.7 01/28/2024     01/28/2024    K 4.3 01/28/2024    CO2 26 01/28/2024     01/28/2024    BUN 14 01/28/2024    CREATININE 0.96 01/28/2024    EGFR >90 01/28/2024     LFTs  Lab Results   Component Value Date    ALT 35 06/13/2020    AST 31 06/13/2020    ALKPHOS 93 06/13/2020    BILITOT 0.5 06/13/2020     FLP  No results found for: \"TRIG\", \"CHOL\", \"LDLF\", \"LDLCALC\", \"HDL\"  Urine Microalbumin  No results found for: \"MICROALBCREA\"  Weight Management  Wt Readings from Last 3 Encounters:   10/10/24 113 kg (249 lb 6.4 oz)   09/10/24 112 kg (248 lb)   02/27/24 117 kg (259 lb)      There is no height or weight on file to calculate BMI.     Assessment/Plan   Problem List Items Addressed This Visit       Morbid obesity with BMI of 40.0-44.9, adult (Multi)     WEIGHT MANAGEMENT  Current regimen includes Wegovy 2.4mg weekly. " Patient's current weight reported as 235lbs. Has lost 25 lbs (~10% of TBW) since starting therapy plan. Due to continued weight loss, plan to continue with current regimen.    Medication Changes:  CONTINUE  Wegovy 2.4mg under the skin once weekly. Will send to [facility] for mail order.    Wegovy Education:  Counseled patient on Wegovy MOA, expectations, side effects, duration of therapy, administration, and monitoring parameters.  Counseled patient on the benefits of GLP-1ra, such as cardiovascular risk reduction, glycemic control, and weight loss potential.  Provided detailed dosing and administration counseling to ensure proper technique.   Reviewed Wegovy titration schedule, starting with 0.25 mg once weekly to 0.5 mg, 1 mg, 1.7mg, and if tolerated 2.4 mg.  Reviewed storage requirements of Wegovy when not in use, and when to administer the medication if a dose is missed.  Discussed risks of GLP1ra including risk of pancreatitis, MTC and worsening of DR  Advised patient that they may experience improved satiety after meals and portion sizes of meals may be reduced as doses of Wegovy increase.    SMOKING CESSATION  Patient is currently using tobacco/nicotine in the form of Cigarettes. Patient has set a quit date of 1979. In the meantime, patient would like to start utilizing NRT to help with his quitting process. Discussed options with patient, and patient would like to utilize Nicotine Lozenges. Will prescribe 4mg dose as patient takes his first cigarette within 30 minutes of waking up.    Medication Changes:  START  Nicotine lozenges 4mg 1 lozenge every 1-2 hours as needed. Max 20 lozenges/day.    Nicotine Lozenges Education:  Medication has been prescribed to help you quit smoking.   Side effects may include warmth or tingling in the mouth and hiccups. If you are experiencing these or any other side effects, contact your pharmacist or PCP.   Take this medication by dissolving 1 lozenge in your  mouth every 1 to 2 hours as needed for nicotine cravings.   Do not chew or swallow; allow to dissolve slowly between cheek and gum (~20 to 30 minutes); minimize swallowing and occasionally move lozenge from one side of the mouth to the other until completely dissolved.   Do not eat or drink for 15 minutes before using or while lozenge is in mouth.   If you have any questions or concerns about this medication, contact your pharmacist or PCP.         Relevant Medications    semaglutide, weight loss, (Wegovy) 2.4 mg/0.75 mL pen injector    nicotine polacrilex (Commit) 4 mg lozenge    Other Relevant Orders    Referral to Clinical Pharmacy     Clinical Pharmacist follow-up: 3/25/2025 at 3:00PM, Telehealth visit    Continue all meds under the continuation of care with the referring provider and clinical pharmacy team.    Thank you,  Deedee Edgar, PharmD  Clinical Pharmacist  [phone]    Verbal consent to manage patient's drug therapy was obtained from the patient. They were informed they may decline to participate or withdraw from participation in pharmacy services at any time.

## 2025-02-25 NOTE — ASSESSMENT & PLAN NOTE
Patient is currently using tobacco/nicotine in the form of Cigarettes. Patient has set a quit date of 1979. In the meantime, patient would like to start utilizing NRT to help with his quitting process. Discussed options with patient, and patient would like to utilize Nicotine Lozenges. Will prescribe 4mg dose as patient takes his first cigarette within 30 minutes of waking up.    Medication Changes:  START  Nicotine lozenges 4mg 1 lozenge every 1-2 hours as needed. Max 20 lozenges/day.    Nicotine Lozenges Education:  Medication has been prescribed to help you quit smoking.   Side effects may include warmth or tingling in the mouth and hiccups. If you are experiencing these or any other side effects, contact your pharmacist or PCP.   Take this medication by dissolving 1 lozenge in your mouth every 1 to 2 hours as needed for nicotine cravings.   Do not chew or swallow; allow to dissolve slowly between cheek and gum (~20 to 30 minutes); minimize swallowing and occasionally move lozenge from one side of the mouth to the other until completely dissolved.   Do not eat or drink for 15 minutes before using or while lozenge is in mouth.   If you have any questions or concerns about this medication, contact your pharmacist or PCP.

## 2025-02-25 NOTE — ASSESSMENT & PLAN NOTE
WEIGHT MANAGEMENT  Current regimen includes Wegovy 2.4mg weekly. Patient's current weight reported as 235lbs. Has lost 25 lbs (~10% of TBW) since starting therapy plan. Due to continued weight loss, plan to continue with current regimen.    Medication Changes:  CONTINUE  Wegovy 2.4mg under the skin once weekly. Will send to [facility] for mail order.    Wegovy Education:  Counseled patient on Wegovy MOA, expectations, side effects, duration of therapy, administration, and monitoring parameters.  Counseled patient on the benefits of GLP-1ra, such as cardiovascular risk reduction, glycemic control, and weight loss potential.  Provided detailed dosing and administration counseling to ensure proper technique.   Reviewed Wegovy titration schedule, starting with 0.25 mg once weekly to 0.5 mg, 1 mg, 1.7mg, and if tolerated 2.4 mg.  Reviewed storage requirements of Wegovy when not in use, and when to administer the medication if a dose is missed.  Discussed risks of GLP1ra including risk of pancreatitis, MTC and worsening of DR  Advised patient that they may experience improved satiety after meals and portion sizes of meals may be reduced as doses of Wegovy increase.    SMOKING CESSATION  Patient is currently using tobacco/nicotine in the form of Cigarettes. Patient has set a quit date of 1979. In the meantime, patient would like to start utilizing NRT to help with his quitting process. Discussed options with patient, and patient would like to utilize Nicotine Lozenges. Will prescribe 4mg dose as patient takes his first cigarette within 30 minutes of waking up.    Medication Changes:  START  Nicotine lozenges 4mg 1 lozenge every 1-2 hours as needed. Max 20 lozenges/day.    Nicotine Lozenges Education:  Medication has been prescribed to help you quit smoking.   Side effects may include warmth or tingling in the mouth and hiccups. If you are experiencing these or any other side effects, contact your pharmacist  or PCP.   Take this medication by dissolving 1 lozenge in your mouth every 1 to 2 hours as needed for nicotine cravings.   Do not chew or swallow; allow to dissolve slowly between cheek and gum (~20 to 30 minutes); minimize swallowing and occasionally move lozenge from one side of the mouth to the other until completely dissolved.   Do not eat or drink for 15 minutes before using or while lozenge is in mouth.   If you have any questions or concerns about this medication, contact your pharmacist or PCP.

## 2025-02-28 ENCOUNTER — PHARMACY VISIT (OUTPATIENT)
Dept: PHARMACY | Facility: CLINIC | Age: 45
End: 2025-02-28
Payer: MEDICARE

## 2025-02-28 RX ORDER — OXYCODONE AND ACETAMINOPHEN 7.5; 325 MG/1; MG/1
1 TABLET ORAL EVERY 12 HOURS PRN
Qty: 30 TABLET | Refills: 0 | Status: SHIPPED | OUTPATIENT
Start: 2025-02-28

## 2025-03-07 DIAGNOSIS — S39.012D STRAIN OF LUMBAR REGION, SUBSEQUENT ENCOUNTER: ICD-10-CM

## 2025-03-07 DIAGNOSIS — M47.26 OSTEOARTHRITIS OF SPINE WITH RADICULOPATHY, LUMBAR REGION: ICD-10-CM

## 2025-03-07 RX ORDER — OXYCODONE AND ACETAMINOPHEN 7.5; 325 MG/1; MG/1
1 TABLET ORAL EVERY 12 HOURS PRN
Qty: 30 TABLET | Refills: 0 | OUTPATIENT
Start: 2025-03-07

## 2025-03-20 DIAGNOSIS — I10 PRIMARY HYPERTENSION: ICD-10-CM

## 2025-03-21 DIAGNOSIS — M47.26 OSTEOARTHRITIS OF SPINE WITH RADICULOPATHY, LUMBAR REGION: ICD-10-CM

## 2025-03-21 DIAGNOSIS — S39.012D STRAIN OF LUMBAR REGION, SUBSEQUENT ENCOUNTER: ICD-10-CM

## 2025-03-21 RX ORDER — OXYCODONE AND ACETAMINOPHEN 7.5; 325 MG/1; MG/1
1 TABLET ORAL EVERY 12 HOURS PRN
Qty: 30 TABLET | Refills: 0 | Status: SHIPPED | OUTPATIENT
Start: 2025-03-21

## 2025-03-21 RX ORDER — LOSARTAN POTASSIUM 100 MG/1
100 TABLET ORAL DAILY
Qty: 90 TABLET | Refills: 1 | Status: SHIPPED | OUTPATIENT
Start: 2025-03-21

## 2025-03-21 NOTE — TELEPHONE ENCOUNTER
Pt calling stating pharmacy is not able/refusing to refill prescription for oxyCODONE-acetaminophen (Percocet) 7.5-325 mg tablet and is wanting pt to be seen through pain management?  Pt is wondering what CB recommends he should do, if he needs to see pain management doctor, or if this can be sent to another pharmacy instead.   Please advise and inform pt.  If another pharmacy is ok, please use [id number] - [place], [place] - [address]

## 2025-03-25 ENCOUNTER — APPOINTMENT (OUTPATIENT)
Dept: PHARMACY | Facility: HOSPITAL | Age: 45
End: 2025-03-25
Payer: COMMERCIAL

## 2025-04-18 DIAGNOSIS — S39.012D STRAIN OF LUMBAR REGION, SUBSEQUENT ENCOUNTER: ICD-10-CM

## 2025-04-18 DIAGNOSIS — M47.26 OSTEOARTHRITIS OF SPINE WITH RADICULOPATHY, LUMBAR REGION: ICD-10-CM

## 2025-04-18 RX ORDER — OXYCODONE AND ACETAMINOPHEN 7.5; 325 MG/1; MG/1
1 TABLET ORAL EVERY 12 HOURS PRN
Qty: 30 TABLET | Refills: 0 | OUTPATIENT
Start: 2025-04-18

## 2025-04-18 NOTE — TELEPHONE ENCOUNTER
DR MCFADDEN PT    PT PHONED OFFICE AND IS REQUESTING REFILL ON    OXYCODONE    DAVID MALONE    PT IS SCHED FOR APT ON 4/22

## 2025-04-22 ENCOUNTER — APPOINTMENT (OUTPATIENT)
Dept: PRIMARY CARE | Facility: CLINIC | Age: 45
End: 2025-04-22
Payer: COMMERCIAL

## 2025-04-22 VITALS
SYSTOLIC BLOOD PRESSURE: 132 MMHG | HEIGHT: 66 IN | OXYGEN SATURATION: 97 % | DIASTOLIC BLOOD PRESSURE: 78 MMHG | WEIGHT: 241 LBS | RESPIRATION RATE: 16 BRPM | BODY MASS INDEX: 38.73 KG/M2 | TEMPERATURE: 97.8 F | HEART RATE: 78 BPM

## 2025-04-22 DIAGNOSIS — E66.01 MORBID OBESITY WITH BMI OF 40.0-44.9, ADULT (MULTI): ICD-10-CM

## 2025-04-22 DIAGNOSIS — E78.2 MIXED HYPERLIPIDEMIA: ICD-10-CM

## 2025-04-22 DIAGNOSIS — Z12.5 SCREENING FOR PROSTATE CANCER: ICD-10-CM

## 2025-04-22 DIAGNOSIS — M47.26 OSTEOARTHRITIS OF SPINE WITH RADICULOPATHY, LUMBAR REGION: ICD-10-CM

## 2025-04-22 DIAGNOSIS — D17.0 LIPOMA OF FACE: Primary | ICD-10-CM

## 2025-04-22 DIAGNOSIS — E55.9 VITAMIN D DEFICIENCY: ICD-10-CM

## 2025-04-22 DIAGNOSIS — R53.83 FATIGUE, UNSPECIFIED TYPE: ICD-10-CM

## 2025-04-22 DIAGNOSIS — I10 PRIMARY HYPERTENSION: ICD-10-CM

## 2025-04-22 DIAGNOSIS — M47.22 OSTEOARTHRITIS OF SPINE WITH RADICULOPATHY, CERVICAL REGION: ICD-10-CM

## 2025-04-22 DIAGNOSIS — Z79.899 MEDICATION MANAGEMENT: ICD-10-CM

## 2025-04-22 PROCEDURE — 3008F BODY MASS INDEX DOCD: CPT | Performed by: FAMILY MEDICINE

## 2025-04-22 PROCEDURE — 99214 OFFICE O/P EST MOD 30 MIN: CPT | Performed by: FAMILY MEDICINE

## 2025-04-22 PROCEDURE — 3075F SYST BP GE 130 - 139MM HG: CPT | Performed by: FAMILY MEDICINE

## 2025-04-22 PROCEDURE — 3078F DIAST BP <80 MM HG: CPT | Performed by: FAMILY MEDICINE

## 2025-04-22 RX ORDER — SEMAGLUTIDE 1.7 MG/.75ML
1.7 INJECTION, SOLUTION SUBCUTANEOUS WEEKLY
Qty: 3 ML | Refills: 0 | Status: SHIPPED | OUTPATIENT
Start: 2025-04-22 | End: 2025-05-14

## 2025-04-22 RX ORDER — SEMAGLUTIDE 2.4 MG/.75ML
2.4 INJECTION, SOLUTION SUBCUTANEOUS WEEKLY
Qty: 3 ML | Refills: 0 | Status: CANCELLED | OUTPATIENT
Start: 2025-04-22 | End: 2025-05-14

## 2025-04-22 ASSESSMENT — PATIENT HEALTH QUESTIONNAIRE - PHQ9
SUM OF ALL RESPONSES TO PHQ9 QUESTIONS 1 AND 2: 0
2. FEELING DOWN, DEPRESSED OR HOPELESS: NOT AT ALL
1. LITTLE INTEREST OR PLEASURE IN DOING THINGS: NOT AT ALL

## 2025-04-22 ASSESSMENT — ENCOUNTER SYMPTOMS
LOSS OF SENSATION IN FEET: 0
DEPRESSION: 0
OCCASIONAL FEELINGS OF UNSTEADINESS: 0

## 2025-04-22 NOTE — PROGRESS NOTES
Subjective   Patient ID: Laci Sainz is a 45 y.o. male who presents for Follow-up (Patient reports that he noticed small cyst on forehead. This is an ongoing issue for him. No pain or drainage reported today. Patient would like to discuss possible treatment.).  History of Present Illness  Laci Sainz is a 45-year-old male who presents for follow-up of low back pain and a lipoma on his forehead.    His low back pain has improved but he still experiences sharp pain when lifting and twisting at work. He works at [employer], which involves daily lifting and twisting. He uses Biofreeze at work and takes Percocet at home for pain management. He has not consulted with a pain management specialist recently. He performs low back exercises daily.    He has a lipoma on his forehead that has been present for years and is becoming more noticeable. It bothers him as it is visible in his car mirrors and others have started to notice it.    He has been out of Heartbeat for about a month, which he uses for weight management. He plans to restart at a lower dose to avoid nausea and then increase the dose after a month.    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 He advertises through business cards and [employer].    Review of Systems  12 Systems have been reviewed as follows.  Constitutional: Fever, weight gain, weight loss, appetite change, night sweats, fatigue, chills.  Eyes : blurry, double vision, vision, loss, tearing, redness, pain, sensitivity to light, glaucoma.  Ears, nose, mouth, and throat: Hearing loss, ringing in the ears, ear pain, nasal congestion, nasal drainage, nosebleeds, mouth, throat, irritation tooth problem.  Cardiovascular :chest pain, pressure, heart racing, palpitations, sweating, leg swelling, high or low blood pressure  Pulmonary: Cough, yellow or green sputum, blood and sputum, shortness of breath, wheezing  Gastrointestinal: Nausea,  "vomiting, diarrhea, constipation, pain, blood in stool, or vomitus, heartburn, difficulty swallowing  Genitourinary: incontinence, abnormal bleeding, abnormal discharge, urinary frequency, urinary hesitancy, pain, impotence sexual problem, infection, urinary retention  Musculoskeletal: Pain, stiffness, joint, redness or warmth, arthritis, back pain, weakness, muscle wasting, sprain or fracture  Neuro: Weight weakness, dizziness, change in voice, change in taste change in vision, change in hearing, loss, or change of sensation, trouble walking, balance problems coordination problems, shaking, speech problem  Endocrine , cold or heat intolerance, blood sugar problem, weight gain or loss missed periods hot flashes, sweats, change in body hair, change in libido, increased thirst, increased urination  Heme/lymph: Swelling, bleeding, problem anemia, bruising, enlarged lymph nodes  Allergic/immunologic: H. plus nasal drip, watery itchy eyes, nasal drainage, immunosuppressed  The above were reviewed and noted negative except as noted in HPI and Problem List.    Objective     /78 (BP Location: Right arm, Patient Position: Sitting, BP Cuff Size: Large adult)   Pulse 78   Temp 36.6 °C (97.8 °F) (Temporal)   Resp 16   Ht 1.676 m (5' 6\")   Wt 109 kg (241 lb)   SpO2 97%   BMI 38.90 kg/m²      Physical Exam    Constitutional: Well developed, well nourished, alert and in no acute distress   Eyes: Normal external exam. Pupils equally round and reactive to light with normal accommodation and extraocular movements intact.  Neck: Supple, no lymphadenopathy or masses.   Cardiovascular: Regular rate and rhythm, normal S1 and S2, no murmurs, gallops, or rubs. Radial pulses normal. No peripheral edema.  Pulmonary: No respiratory distress, lungs clear to auscultation bilaterally. No wheezes, rhonchi, rales.  Abdomen: soft,non tender, non distended, without masses or HSM  Skin: Warm, well perfused, normal skin turgor and color. "   Neurologic: Cranial nerves II-XII grossly intact.   Psychiatric: Mood calm and affect normal  Musculoskeletal: Moving all extremities without restriction  The above were reviewed and noted negative except as noted in HPI and Problem List.      Results           Assessment & Plan  Strain of lumbar region, subsequent encounter  Chronic lumbar strain with intermittent sharp pain exacerbated by lifting and twisting at work. Pain is managed with exercises and occasional use of oxycodone-acetaminophen. He prefers to avoid taking oxycodone-acetaminophen at work and uses Biofreeze and cold therapy instead.  - Continue low back exercises daily.  - Use Biofreeze and cold therapy as needed for pain management.  - Take oxycodone-acetaminophen as needed, preferably outside of work hours.    Lipoma  Lipoma on the forehead, increasing in size and noticeable to others. He is interested in removal due to cosmetic concerns. Referral to a plastic surgeon is planned for evaluation and potential removal. The procedure will involve cutting over the lipoma, dissecting down, and removing it, which will result in a small scar.  - Refer to Dr. Reyes, a plastic surgeon, for evaluation and potential removal of the lipoma.    Primary Hypertension  Blood pressure is well-controlled with current medication regimen.    General Health Maintenance  He is due for blood work and has not yet completed Cologuard screening.  - Complete Cologuard screening.  - Schedule and complete fasting blood work within a month.    Follow-up  He has been off Semaglutide (Wegovy) for a month and will restart at a lower dose to minimize nausea. Virtual visit planned in a month to reassess and adjust dosage.  - Restart Semaglutide (Wegovy) at 1.7 mg for one month.  - Schedule a virtual visit in one month to reassess and potentially increase Semaglutide dosage to 2.4 mg.    Problem List Items Addressed This Visit       HTN (hypertension)    Relevant Orders    Follow Up  In [facility] - PCP - Established    Osteoarthritis of cervical spine    Relevant Orders    Follow Up In [facility] - PCP - Established    Osteoarthritis of spine with radiculopathy, lumbar region    Relevant Orders    Follow Up In [facility] - PCP - Established    Morbid obesity with BMI of 40.0-44.9, adult (Multi)    Relevant Medications    semaglutide, weight loss, (Wegovy) 1.7 mg/0.75 mL pen injector    Other Relevant Orders    Follow Up In [facility] - PCP - Established     Other Visit Diagnoses         Lipoma of face    -  Primary    Relevant Orders    Referral to Plastic Surgery    Follow Up In [facility] - PCP - Established      Medication management        Relevant Orders    Opiate/Opioid/Benzo Prescription Compliance    Follow Up In [facility] - PCP - Established      Screening for prostate cancer        Relevant Orders    Follow Up In [facility] - PCP - Established    Prostate Specific Antigen, Screen      Fatigue, unspecified type        Relevant Orders    Follow Up In [facility] - PCP - Established    CBC and Auto Differential    Thyroid Stimulating Hormone      Mixed hyperlipidemia        Relevant Orders    Follow Up In [facility] - PCP - Established    Lipid Panel    Comprehensive Metabolic Panel      Vitamin D deficiency        Relevant Orders    Follow Up In [facility] - PCP - Established    Vitamin D 25-Hydroxy,Total (for eval of Vitamin D levels)             Arturo Roman MD       This medical note was created with the assistance of artificial intelligence (AI) for documentation purposes. The content has been reviewed and confirmed by the healthcare provider for accuracy and completeness. Patient consented to the use of audio recording and use of AI during their visit.     OV in office Q 3months    -Diclofenac 75 mg twice daily      -Take BP meds daily     - PT & ICE     -oxycodone as  needed     Increase wegovy 1.7 mg      Excise lipoma next set up forehead

## 2025-04-24 LAB
1OH-MIDAZOLAM UR-MCNC: NEGATIVE NG/ML
7AMINOCLONAZEPAM UR-MCNC: NEGATIVE NG/ML
A-OH ALPRAZ UR-MCNC: NEGATIVE NG/ML
A-OH-TRIAZOLAM UR-MCNC: NEGATIVE NG/ML
AMPHETAMINES UR QL: NEGATIVE NG/ML
BARBITURATES UR QL: NEGATIVE NG/ML
BZE UR QL: NEGATIVE NG/ML
CODEINE UR-MCNC: NEGATIVE NG/ML
CREAT UR-MCNC: 245.5 MG/DL
DRUG SCREEN COMMENT UR-IMP: ABNORMAL
EDDP UR-MCNC: NEGATIVE NG/ML
FENTANYL UR-MCNC: NEGATIVE NG/ML
HYDROCODONE UR-MCNC: NEGATIVE NG/ML
HYDROMORPHONE UR-MCNC: NEGATIVE NG/ML
LORAZEPAM UR-MCNC: NEGATIVE NG/ML
METHADONE UR-MCNC: NEGATIVE NG/ML
MORPHINE UR-MCNC: NEGATIVE NG/ML
NORDIAZEPAM UR-MCNC: NEGATIVE NG/ML
NORFENTANYL UR-MCNC: NEGATIVE NG/ML
NORHYDROCODONE UR CFM-MCNC: NEGATIVE NG/ML
NOROXYCODONE UR CFM-MCNC: 647 NG/ML
NORTRAMADOL UR-MCNC: NEGATIVE NG/ML
OH-ETHYLFLURAZ UR-MCNC: NEGATIVE NG/ML
OXAZEPAM UR-MCNC: NEGATIVE NG/ML
OXIDANTS UR QL: NEGATIVE MCG/ML
OXYCODONE UR CFM-MCNC: 1630 NG/ML
OXYMORPHONE UR CFM-MCNC: 1160 NG/ML
PCP UR QL: NEGATIVE NG/ML
PH UR: 5.3 [PH] (ref 4.5–9)
QUEST 6 ACETYLMORPHINE: NEGATIVE NG/ML
QUEST NOTES AND COMMENTS: ABNORMAL
QUEST ZOLPIDEM: NEGATIVE NG/ML
TEMAZEPAM UR-MCNC: NEGATIVE NG/ML
THC UR QL: NEGATIVE NG/ML
TRAMADOL UR-MCNC: NEGATIVE NG/ML
ZOLPIDEM PHENYL-4-CARB UR CFM-MCNC: NEGATIVE NG/ML

## 2025-04-24 PROCEDURE — RXMED WILLOW AMBULATORY MEDICATION CHARGE

## 2025-04-25 ENCOUNTER — PHARMACY VISIT (OUTPATIENT)
Dept: PHARMACY | Facility: CLINIC | Age: 45
End: 2025-04-25
Payer: MEDICARE

## 2025-05-16 DIAGNOSIS — E66.01 MORBID OBESITY WITH BMI OF 40.0-44.9, ADULT (MULTI): ICD-10-CM

## 2025-05-16 DIAGNOSIS — E66.01 MORBID OBESITY WITH BMI OF 40.0-44.9, ADULT (MULTI): Primary | ICD-10-CM

## 2025-05-16 RX ORDER — SEMAGLUTIDE 2.4 MG/.75ML
2.4 INJECTION, SOLUTION SUBCUTANEOUS WEEKLY
Qty: 3 ML | Refills: 0 | Status: SHIPPED | OUTPATIENT
Start: 2025-05-16 | End: 2025-06-07

## 2025-05-20 DIAGNOSIS — S39.012D STRAIN OF LUMBAR REGION, SUBSEQUENT ENCOUNTER: ICD-10-CM

## 2025-05-20 DIAGNOSIS — M47.26 OSTEOARTHRITIS OF SPINE WITH RADICULOPATHY, LUMBAR REGION: ICD-10-CM

## 2025-05-20 NOTE — TELEPHONE ENCOUNTER
Dr. Roman Pt    Refill for    oxyCODONE-acetaminophen (Percocet) 7.5-325 mg tablet    [id number] - [place], [place] - [address]

## 2025-05-24 RX ORDER — OXYCODONE AND ACETAMINOPHEN 7.5; 325 MG/1; MG/1
1 TABLET ORAL EVERY 12 HOURS PRN
Qty: 30 TABLET | Refills: 0 | Status: SHIPPED | OUTPATIENT
Start: 2025-05-24

## 2025-05-29 ENCOUNTER — APPOINTMENT (OUTPATIENT)
Dept: PHARMACY | Facility: HOSPITAL | Age: 45
End: 2025-05-29
Payer: COMMERCIAL

## 2025-05-29 DIAGNOSIS — E66.01 MORBID OBESITY WITH BMI OF 40.0-44.9, ADULT (MULTI): ICD-10-CM

## 2025-05-29 PROCEDURE — RXMED WILLOW AMBULATORY MEDICATION CHARGE

## 2025-05-29 RX ORDER — SEMAGLUTIDE 1 MG/.5ML
1 INJECTION, SOLUTION SUBCUTANEOUS WEEKLY
Qty: 2 ML | Refills: 0 | Status: SHIPPED | OUTPATIENT
Start: 2025-05-29 | End: 2025-06-27

## 2025-05-29 NOTE — PROGRESS NOTES
"  Clinical Pharmacy Appointment    Patient ID: Laci Sainz is a 45 y.o. male who presents for Weight Management.    Pt is here for Follow Up appointment.     Referring Provider: Arturo Roman MD  PCP: Arturo Roman MD   Last visit with PCP: 10/10/2024   Next visit with PCP: not scheduled; encouraged    The patient has been APPROVED for prior authorization for Wegovy through 9/15/2025. Patient has been contacted regarding the status of their prior authorization.     Subjective     HPI  WEIGHT LOSS  BMI Readings from Last 3 Encounters:   04/22/25 38.90 kg/m²   10/10/24 40.25 kg/m²   09/10/24 40.03 kg/m²      Starting weight: 260 lbs. (2/6/2024)  Current weight: 235 lbs.    Lifestyle  Diet:   He works 7pm-7am so he reported he doesn't eat the traditional meals. He says that there are 8 children living in his house. 3 kids are his girlfriends and 5 kids are his girlfriend's sisters and therefore they often get take out or he just eats whatever he can find in the house. He stated often times what he can find is \"junk food.\"  He reported when he is working he does eat less however when he has days off that tends to be when he is eating the \"junk food\"  Breakfast: Will pickup chicken from [employer] store, popcorn, or avocado on his way into work.   Lunch: M[employer]ay Johns  Dinner: does not eat     Exercise Routine:   Patient works 12 hour shifts where he is on his feet the whole time and having to do a lot of lifting  Has not been exercising but has been wanting to get back into the gym. Stated trying to weigh his options of where to get a membership.     Other Potential Contributing Factors  Tobacco use: Yes - 0.5 pack per day  Other drug use: No  Medications that may cause weight gain: none  Mental health: No current concerns  Eating Disorders: Denies Hx of any eating disorder  Comorbidities: Comorbidities: hypertension controlled with oral meds    Pertinent PMH Review:  PMH of Pancreatitis: No  PMH of " "Retinopathy: No  PMH of MTC: No  PMH of MEN2: No    Non-Pharmacological Therapy  Weight loss techniques attempted:  Self-directed dieting: calorie restricting    Pharmacological Therapy  Current Medications: Wegovy 2.4mg weekly (Wednesday)  Clarifications to above regimen: patient has been off medication for a week due to side effects  Adverse Effects: Nausea    SMOKING CESSATION  Nicotine Use Assessment  Products used: Cigarettes  Current cigarettes per day: 13 per day  Timing of first cigarette: <30 minutes of waking up  Smoking locations: Home, Car, Work, and Social settings  Other smokers in household?: No  Previous quit attempts?: Yes, quit for 4 months, 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  Rates readiness to quit smoking as 8/10.  Concerns about quitting: withdrawals    Quit Plan  Quit Date: unable to assess today    Environment  Remove all cigarettes, ashtrays, and lighters from home, car, and/or workplace  Set a \"No Smoking\" rule for your home  Avoid activities and situations that trigger the urge to smoke    Support System  Healthcare team  1-800-QUIT NOW (956-7255)  BecomeAnEx.org and SmokeFree.gov    Craving Management  Chew gum, have a mint or hard candy, or have a health snack  Take a deep breath through your nose and blow out slowly through your mouth. Repeat 10 times.  Engage in physical activity, such as going for a walk or jog  Do something with a friend or family member    Insurance coverage of weight-loss medications? Yes, PA approved through 9/15/2025; about $35/month  Eligible for copay cards/programs? Yes, commercially " "insured    Medication Reconciliation:  No changes made today    Drug Interactions  No relevant drug interactions were noted.    Medication System Management  Patient's preferred pharmacy: [facility]  Adherence/Organization: No issues reported  Affordability/Accessibility: No issues reported      Objective   No Known Allergies  Social History     Social History Narrative    Not on file      Medication Review  Current Outpatient Medications   Medication Instructions    amLODIPine (NORVASC) 10 mg, oral, Daily    hydroCHLOROthiazide (HYDRODIURIL) 25 mg, oral, Daily    losartan (COZAAR) 100 mg, oral, Daily    naloxone (NARCAN) 4 mg, nasal, As needed, May repeat every 2-3 minutes if needed, alternating nostrils, until medical assistance becomes available.    nicotine polacrilex (Commit) 4 mg lozenge Dissolve 1 lozenge in the mouth every 1-2 hours as needed for smoking cessation. Max 20 lozenges/day.    oxyCODONE-acetaminophen (Percocet) 7.5-325 mg tablet 1 tablet, oral, Every 12 hours PRN    Wegovy 1 mg, subcutaneous, Weekly      Vitals  BP Readings from Last 2 Encounters:   04/22/25 132/78   10/10/24 (!) 142/92     BMI Readings from Last 1 Encounters:   04/22/25 38.90 kg/m²      Labs  A1C  No results found for: \"HGBA1C\"  BMP  Lab Results   Component Value Date    CALCIUM 9.7 01/28/2024     01/28/2024    K 4.3 01/28/2024    CO2 26 01/28/2024     01/28/2024    BUN 14 01/28/2024    CREATININE 0.96 01/28/2024    EGFR >90 01/28/2024     LFTs  Lab Results   Component Value Date    ALT 35 06/13/2020    AST 31 06/13/2020    ALKPHOS 93 06/13/2020    BILITOT 0.5 06/13/2020     FLP  No results found for: \"TRIG\", \"CHOL\", \"LDLF\", \"LDLCALC\", \"HDL\"  Urine Microalbumin  No results found for: \"MICROALBCREA\"  Weight Management  Wt Readings from Last 3 Encounters:   04/22/25 109 kg (241 lb)   10/10/24 113 kg (249 lb 6.4 oz)   09/10/24 112 kg (248 lb)      There is no height or weight on file to calculate BMI. "     Assessment/Plan   Problem List Items Addressed This Visit       Morbid obesity with BMI of 40.0-44.9, adult (Multi)    WEIGHT MANAGEMENT  Current regimen includes Wegovy 2.4mg weekly. Patient has been off medication due to side effects. Patient's current weight reported as 235lbs. Has lost 25 lbs (~10% of TBW) since starting therapy plan. Due to side effects, will lower the dose to 1mg weekly.    Medication Changes:  DECREASE  Wegovy to 1mg under the skin once weekly. Will send to [facility] for mail order.    Wegovy Education:  Counseled patient on Wegovy MOA, expectations, side effects, duration of therapy, administration, and monitoring parameters.  Counseled patient on the benefits of GLP-1ra, such as cardiovascular risk reduction, glycemic control, and weight loss potential.  Provided detailed dosing and administration counseling to ensure proper technique.   Reviewed Wegovy titration schedule, starting with 0.25 mg once weekly to 0.5 mg, 1 mg, 1.7mg, and if tolerated 2.4 mg.  Reviewed storage requirements of Wegovy when not in use, and when to administer the medication if a dose is missed.  Discussed risks of GLP1ra including risk of pancreatitis, MTC and worsening of DR  Advised patient that they may experience improved satiety after meals and portion sizes of meals may be reduced as doses of Wegovy increase.    SMOKING CESSATION  Patient is currently using tobacco/nicotine in the form of Cigarettes. Patient had set a quit date of 3/28/2025, however he was not able to quit. Patient would like to continue working on decreasing the amount of cigarretes and continue utilizing nicotine lozenges, however would not like to set a new quit date today.    Medication Changes:  START  Nicotine lozenges 4mg 1 lozenge every 1-2 hours as needed. Max 20 lozenges/day.    Nicotine Lozenges Education:  Medication has been prescribed to help you quit smoking.   Side effects may include warmth or tingling in the  mouth and hiccups. If you are experiencing these or any other side effects, contact your pharmacist or PCP.   Take this medication by dissolving 1 lozenge in your mouth every 1 to 2 hours as needed for nicotine cravings.   Do not chew or swallow; allow to dissolve slowly between cheek and gum (~20 to 30 minutes); minimize swallowing and occasionally move lozenge from one side of the mouth to the other until completely dissolved.   Do not eat or drink for 15 minutes before using or while lozenge is in mouth.   If you have any questions or concerns about this medication, contact your pharmacist or PCP.         Relevant Medications    semaglutide, weight loss, (Wegovy) 1 mg/0.5 mL pen injector    Other Relevant Orders    Referral to Clinical Pharmacy       Clinical Pharmacist follow-up: 6/26/2025 at 1:20PM, Telehealth visit    Continue all meds under the continuation of care with the referring provider and clinical pharmacy team.    Thank you,  Deedee Edgar, PharmD  Clinical Pharmacist  [phone]    Verbal consent to manage patient's drug therapy was obtained from the patient. They were informed they may decline to participate or withdraw from participation in pharmacy services at any time.

## 2025-05-29 NOTE — ASSESSMENT & PLAN NOTE
WEIGHT MANAGEMENT  Current regimen includes Wegovy 2.4mg weekly. Patient has been off medication due to side effects. Patient's current weight reported as 235lbs. Has lost 25 lbs (~10% of TBW) since starting therapy plan. Due to side effects, will lower the dose to 1mg weekly.    Medication Changes:  DECREASE  Wegovy to 1mg under the skin once weekly. Will send to [facility] for mail order.    Wegovy Education:  Counseled patient on Wegovy MOA, expectations, side effects, duration of therapy, administration, and monitoring parameters.  Counseled patient on the benefits of GLP-1ra, such as cardiovascular risk reduction, glycemic control, and weight loss potential.  Provided detailed dosing and administration counseling to ensure proper technique.   Reviewed Wegovy titration schedule, starting with 0.25 mg once weekly to 0.5 mg, 1 mg, 1.7mg, and if tolerated 2.4 mg.  Reviewed storage requirements of Wegovy when not in use, and when to administer the medication if a dose is missed.  Discussed risks of GLP1ra including risk of pancreatitis, MTC and worsening of DR  Advised patient that they may experience improved satiety after meals and portion sizes of meals may be reduced as doses of Wegovy increase.    SMOKING CESSATION  Patient is currently using tobacco/nicotine in the form of Cigarettes. Patient had set a quit date of 3/28/2025, however he was not able to quit. Patient would like to continue working on decreasing the amount of cigarretes and continue utilizing nicotine lozenges, however would not like to set a new quit date today.    Medication Changes:  START  Nicotine lozenges 4mg 1 lozenge every 1-2 hours as needed. Max 20 lozenges/day.    Nicotine Lozenges Education:  Medication has been prescribed to help you quit smoking.   Side effects may include warmth or tingling in the mouth and hiccups. If you are experiencing these or any other side effects, contact your pharmacist or PCP.   Take this  medication by dissolving 1 lozenge in your mouth every 1 to 2 hours as needed for nicotine cravings.   Do not chew or swallow; allow to dissolve slowly between cheek and gum (~20 to 30 minutes); minimize swallowing and occasionally move lozenge from one side of the mouth to the other until completely dissolved.   Do not eat or drink for 15 minutes before using or while lozenge is in mouth.   If you have any questions or concerns about this medication, contact your pharmacist or PCP.

## 2025-06-02 ENCOUNTER — PHARMACY VISIT (OUTPATIENT)
Dept: PHARMACY | Facility: CLINIC | Age: 45
End: 2025-06-02
Payer: MEDICARE

## 2025-06-04 ENCOUNTER — APPOINTMENT (OUTPATIENT)
Dept: PLASTIC SURGERY | Facility: CLINIC | Age: 45
End: 2025-06-04
Payer: COMMERCIAL

## 2025-06-23 DIAGNOSIS — E66.01 MORBID OBESITY WITH BMI OF 40.0-44.9, ADULT (MULTI): ICD-10-CM

## 2025-06-23 RX ORDER — SEMAGLUTIDE 1 MG/.5ML
1 INJECTION, SOLUTION SUBCUTANEOUS WEEKLY
Qty: 2 ML | Refills: 0 | OUTPATIENT
Start: 2025-06-23 | End: 2025-07-15

## 2025-06-26 ENCOUNTER — APPOINTMENT (OUTPATIENT)
Dept: PHARMACY | Facility: HOSPITAL | Age: 45
End: 2025-06-26
Payer: COMMERCIAL

## 2025-06-26 DIAGNOSIS — E66.01 MORBID OBESITY WITH BMI OF 40.0-44.9, ADULT (MULTI): ICD-10-CM

## 2025-06-26 NOTE — PROGRESS NOTES
"  Clinical Pharmacy Appointment    Patient ID: Laci Sainz is a 45 y.o. male who presents for Weight Management.    Pt is here for Follow Up appointment.     Referring Provider: Arturo Roman MD  PCP: Arturo Roman MD   Last visit with PCP: 10/10/2024   Next visit with PCP: not scheduled; encouraged    The patient has been APPROVED for prior authorization for Wegovy through 9/15/2025. Patient has been contacted regarding the status of their prior authorization.     Subjective     HPI  WEIGHT LOSS  BMI Readings from Last 3 Encounters:   04/22/25 38.90 kg/m²   10/10/24 40.25 kg/m²   09/10/24 40.03 kg/m²      Starting weight: 260 lbs. (2/6/2024)  Current weight: 235 lbs.    Lifestyle  Diet:   He works 7pm-7am so he reported he doesn't eat the traditional meals. He says that there are 8 children living in his house. 3 kids are his girlfriends and 5 kids are his girlfriend's sisters and therefore they often get take out or he just eats whatever he can find in the house. He stated often times what he can find is \"junk food.\"  He reported when he is working he does eat less however when he has days off that tends to be when he is eating the \"junk food\"  Breakfast: Will pickup chicken from [employer] store, popcorn, or avocado on his way into work.   Lunch: M[employer]  Dinner: does not eat     Exercise Routine:   Patient works 12 hour shifts where he is on his feet the whole time and having to do a lot of lifting  Has not been exercising but has been wanting to get back into the gym. Stated trying to weigh his options of where to get a membership.     Other Potential Contributing Factors  Tobacco use: Yes - 0.5 pack per day  Other drug use: No  Medications that may cause weight gain: none  Mental health: No current concerns  Eating Disorders: Denies Hx of any eating disorder  Comorbidities: Comorbidities: hypertension controlled with oral meds    Pertinent PMH Review:  PMH of Pancreatitis: No  PMH of " "Retinopathy: No  PMH of MTC: No  PMH of MEN2: No    Non-Pharmacological Therapy  Weight loss techniques attempted:  Self-directed dieting: calorie restricting    Pharmacological Therapy  Current Medications: Wegovy 1mg weekly (Wednesday)  Clarifications to above regimen: patient has been off medication for a week due to side effects  Adverse Effects: Denies    SMOKING CESSATION  Nicotine Use Assessment  Products used: Cigarettes  Current cigarettes per day: 13 per day  Timing of first cigarette: <30 minutes of waking up  Smoking locations: Home, Car, Work, and Social settings  Other smokers in household?: No  Previous quit attempts?: Yes, quit for 4 months, 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  Rates readiness to quit smoking as 8/10.  Concerns about quitting: withdrawals    Quit Plan  Quit Date: unable to assess today    Environment  Remove all cigarettes, ashtrays, and lighters from home, car, and/or workplace  Set a \"No Smoking\" rule for your home  Avoid activities and situations that trigger the urge to smoke    Support System  Healthcare team  1-800-QUIT NOW (068-8341)  BecomeAnEx.org and SmokeFree.gov    Craving Management  Chew gum, have a mint or hard candy, or have a health snack  Take a deep breath through your nose and blow out slowly through your mouth. Repeat 10 times.  Engage in physical activity, such as going for a walk or jog  Do something with a friend or family member    Insurance coverage of weight-loss medications? Yes, PA approved through 9/15/2025; about $35/month  Eligible for copay cards/programs? Yes, commercially " "insured    Medication Reconciliation:  No changes made today    Drug Interactions  No relevant drug interactions were noted.    Medication System Management  Patient's preferred pharmacy: [facility]  Adherence/Organization: No issues reported  Affordability/Accessibility: No issues reported      Objective   No Known Allergies  Social History     Social History Narrative    Not on file      Medication Review  Current Outpatient Medications   Medication Instructions    amLODIPine (NORVASC) 10 mg, oral, Daily    hydroCHLOROthiazide (HYDRODIURIL) 25 mg, oral, Daily    losartan (COZAAR) 100 mg, oral, Daily    naloxone (NARCAN) 4 mg, nasal, As needed, May repeat every 2-3 minutes if needed, alternating nostrils, until medical assistance becomes available.    nicotine polacrilex (Commit) 4 mg lozenge Dissolve 1 lozenge in the mouth every 1-2 hours as needed for smoking cessation. Max 20 lozenges/day.    oxyCODONE-acetaminophen (Percocet) 7.5-325 mg tablet 1 tablet, oral, Every 12 hours PRN    Wegovy 1 mg, subcutaneous, Weekly      Vitals  BP Readings from Last 2 Encounters:   04/22/25 132/78   10/10/24 (!) 142/92     BMI Readings from Last 1 Encounters:   04/22/25 38.90 kg/m²      Labs  A1C  No results found for: \"HGBA1C\"  BMP  Lab Results   Component Value Date    CALCIUM 9.7 01/28/2024     01/28/2024    K 4.3 01/28/2024    CO2 26 01/28/2024     01/28/2024    BUN 14 01/28/2024    CREATININE 0.96 01/28/2024    EGFR >90 01/28/2024     LFTs  Lab Results   Component Value Date    ALT 35 06/13/2020    AST 31 06/13/2020    ALKPHOS 93 06/13/2020    BILITOT 0.5 06/13/2020     FLP  No results found for: \"TRIG\", \"CHOL\", \"LDLF\", \"LDLCALC\", \"HDL\"  Urine Microalbumin  No results found for: \"MICROALBCREA\"  Weight Management  Wt Readings from Last 3 Encounters:   04/22/25 109 kg (241 lb)   10/10/24 113 kg (249 lb 6.4 oz)   09/10/24 112 kg (248 lb)      There is no height or weight on file to calculate BMI. "     Assessment/Plan   Problem List Items Addressed This Visit       Morbid obesity with BMI of 40.0-44.9, adult (Multi)    WEIGHT MANAGEMENT  Current regimen includes Wegovy 1mg weekly. Patient has been off medication due to side effects. Patient's current weight reported as 235 lbs. Has lost 25 lbs (~10% of TBW) since starting therapy plan.     Patient states he would like to come off the medication for a month to see how he does on his own without the medication.    Medication Changes:  DISCONTINUE  Wegovy 1mg under the skin once weekly.  CONTINUE  Working on diet/lifestyle    Wegovy Education:  Counseled patient on Wegovy MOA, expectations, side effects, duration of therapy, administration, and monitoring parameters.  Counseled patient on the benefits of GLP-1ra, such as cardiovascular risk reduction, glycemic control, and weight loss potential.  Provided detailed dosing and administration counseling to ensure proper technique.   Reviewed Wegovy titration schedule, starting with 0.25 mg once weekly to 0.5 mg, 1 mg, 1.7mg, and if tolerated 2.4 mg.  Reviewed storage requirements of Wegovy when not in use, and when to administer the medication if a dose is missed.  Discussed risks of GLP1ra including risk of pancreatitis, MTC and worsening of DR  Advised patient that they may experience improved satiety after meals and portion sizes of meals may be reduced as doses of Wegovy increase.    SMOKING CESSATION  Patient is currently using tobacco/nicotine in the form of Cigarettes. Patient had set a quit date of 3/28/2025, however he was not able to quit. Patient would like to continue working on decreasing the amount of cigarretes and continue utilizing nicotine lozenges, however would not like to set a new quit date today.    Medication Changes:  START  Nicotine lozenges 4mg 1 lozenge every 1-2 hours as needed. Max 20 lozenges/day.    Nicotine Lozenges Education:  Medication has been prescribed to help you quit smoking.    Side effects may include warmth or tingling in the mouth and hiccups. If you are experiencing these or any other side effects, contact your pharmacist or PCP.   Take this medication by dissolving 1 lozenge in your mouth every 1 to 2 hours as needed for nicotine cravings.   Do not chew or swallow; allow to dissolve slowly between cheek and gum (~20 to 30 minutes); minimize swallowing and occasionally move lozenge from one side of the mouth to the other until completely dissolved.   Do not eat or drink for 15 minutes before using or while lozenge is in mouth.   If you have any questions or concerns about this medication, contact your pharmacist or PCP.         Relevant Orders    Referral to Clinical Pharmacy       Clinical Pharmacist follow-up: 8/7/2025 at 1:20PM, Telehealth visit    Continue all meds under the continuation of care with the referring provider and clinical pharmacy team.    Thank you,  Deedee Edgar, PharmD  Clinical Pharmacist  [phone]    Verbal consent to manage patient's drug therapy was obtained from the patient. They were informed they may decline to participate or withdraw from participation in pharmacy services at any time.

## 2025-06-26 NOTE — ASSESSMENT & PLAN NOTE
WEIGHT MANAGEMENT  Current regimen includes Wegovy 1mg weekly. Patient has been off medication due to side effects. Patient's current weight reported as 235 lbs. Has lost 25 lbs (~10% of TBW) since starting therapy plan.     Patient states he would like to come off the medication for a month to see how he does on his own without the medication.    Medication Changes:  DISCONTINUE  Wegovy 1mg under the skin once weekly.  CONTINUE  Working on diet/lifestyle    Wegovy Education:  Counseled patient on Wegovy MOA, expectations, side effects, duration of therapy, administration, and monitoring parameters.  Counseled patient on the benefits of GLP-1ra, such as cardiovascular risk reduction, glycemic control, and weight loss potential.  Provided detailed dosing and administration counseling to ensure proper technique.   Reviewed Wegovy titration schedule, starting with 0.25 mg once weekly to 0.5 mg, 1 mg, 1.7mg, and if tolerated 2.4 mg.  Reviewed storage requirements of Wegovy when not in use, and when to administer the medication if a dose is missed.  Discussed risks of GLP1ra including risk of pancreatitis, MTC and worsening of DR  Advised patient that they may experience improved satiety after meals and portion sizes of meals may be reduced as doses of Wegovy increase.    SMOKING CESSATION  Patient is currently using tobacco/nicotine in the form of Cigarettes. Patient had set a quit date of 3/28/2025, however he was not able to quit. Patient would like to continue working on decreasing the amount of cigarretes and continue utilizing nicotine lozenges, however would not like to set a new quit date today.    Medication Changes:  START  Nicotine lozenges 4mg 1 lozenge every 1-2 hours as needed. Max 20 lozenges/day.    Nicotine Lozenges Education:  Medication has been prescribed to help you quit smoking.   Side effects may include warmth or tingling in the mouth and hiccups. If you are experiencing these or any other side  effects, contact your pharmacist or PCP.   Take this medication by dissolving 1 lozenge in your mouth every 1 to 2 hours as needed for nicotine cravings.   Do not chew or swallow; allow to dissolve slowly between cheek and gum (~20 to 30 minutes); minimize swallowing and occasionally move lozenge from one side of the mouth to the other until completely dissolved.   Do not eat or drink for 15 minutes before using or while lozenge is in mouth.   If you have any questions or concerns about this medication, contact your pharmacist or PCP.

## 2025-07-03 DIAGNOSIS — S39.012D STRAIN OF LUMBAR REGION, SUBSEQUENT ENCOUNTER: ICD-10-CM

## 2025-07-03 DIAGNOSIS — M47.26 OSTEOARTHRITIS OF SPINE WITH RADICULOPATHY, LUMBAR REGION: ICD-10-CM

## 2025-07-03 NOTE — TELEPHONE ENCOUNTER
Recent Visits  Date Type Provider Dept   04/22/25 Office Visit Arturo Roman MD [email or web address]   Showing recent visits within past 180 days and meeting all other requirements  Future Appointments  No visits were found meeting these conditions.  Showing future appointments within next 90 days and meeting all other requirements

## 2025-07-06 RX ORDER — OXYCODONE AND ACETAMINOPHEN 7.5; 325 MG/1; MG/1
1 TABLET ORAL EVERY 12 HOURS PRN
Qty: 30 TABLET | Refills: 0 | Status: SHIPPED | OUTPATIENT
Start: 2025-07-06

## 2025-07-08 DIAGNOSIS — I10 PRIMARY HYPERTENSION: ICD-10-CM

## 2025-07-08 NOTE — TELEPHONE ENCOUNTER
DR MCFADDEN PT    PT PHONED OFFICE AND IS REQUESTING REFILL ON    AMLODIPINE   HYDROCHLOROTHIAZIDE    [facility]

## 2025-07-09 RX ORDER — HYDROCHLOROTHIAZIDE 25 MG/1
25 TABLET ORAL DAILY
Qty: 90 TABLET | Refills: 1 | Status: SHIPPED | OUTPATIENT
Start: 2025-07-09

## 2025-07-09 RX ORDER — AMLODIPINE BESYLATE 10 MG/1
10 TABLET ORAL DAILY
Qty: 90 TABLET | Refills: 1 | Status: SHIPPED | OUTPATIENT
Start: 2025-07-09

## 2025-08-07 ENCOUNTER — APPOINTMENT (OUTPATIENT)
Dept: PHARMACY | Facility: HOSPITAL | Age: 45
End: 2025-08-07
Payer: COMMERCIAL

## 2025-08-07 DIAGNOSIS — E66.01 MORBID OBESITY WITH BMI OF 40.0-44.9, ADULT (MULTI): Primary | ICD-10-CM

## 2025-08-07 NOTE — ASSESSMENT & PLAN NOTE
WEIGHT MANAGEMENT  Patient is not currently on any weight management medication. Patient's current weight reported as 235 lbs. Has lost 25 lbs (~10% of TBW) since starting therapy plan.     Patient states he would like to stay off the medication for a month to see how he does on his own without the medication.    Medication Changes:  CONTINUE  Working on diet/lifestyle

## 2025-08-07 NOTE — PROGRESS NOTES
"  Clinical Pharmacy Appointment    Patient ID: Laci Sainz is a 45 y.o. male who presents for Weight Management.    Pt is here for Follow Up appointment.     Referring Provider: Arturo Roman MD  PCP: Arturo Roman MD   Last visit with PCP: 10/10/2024   Next visit with PCP: not scheduled; encouraged    The patient has been APPROVED for prior authorization for Wegovy through 9/15/2025. Patient has been contacted regarding the status of their prior authorization.     Subjective     HPI  WEIGHT LOSS  BMI Readings from Last 3 Encounters:   04/22/25 38.90 kg/m²   10/10/24 40.25 kg/m²   09/10/24 40.03 kg/m²      Starting weight: 260 lbs. (2/6/2024)  Current weight: 235 lbs.    Lifestyle  Diet:   He works 7pm-7am so he reported he doesn't eat the traditional meals. He says that there are 8 children living in his house. 3 kids are his girlfriends and 5 kids are his girlfriend's sisters and therefore they often get take out or he just eats whatever he can find in the house. He stated often times what he can find is \"junk food.\"  He reported when he is working he does eat less however when he has days off that tends to be when he is eating the \"junk food\"  Breakfast: Will pickup chicken from [employer], popcorn, or avocado on his way into work.   Lunch: [employer]  Dinner: does not eat     Exercise Routine:   Patient works 12 hour shifts where he is on his feet the whole time and having to do a lot of lifting  Has not been exercising but has been wanting to get back into the gym. Stated trying to weigh his options of where to get a membership.     Other Potential Contributing Factors  Tobacco use: Yes - 0.5 pack per day  Other drug use: No  Medications that may cause weight gain: none  Mental health: No current concerns  Eating Disorders: Denies Hx of any eating disorder  Comorbidities: Comorbidities: hypertension controlled with oral meds    Pertinent PMH Review:  PMH of Pancreatitis: No  PMH of " "Retinopathy: No  PMH of MTC: No  PMH of MEN2: No    Non-Pharmacological Therapy  Weight loss techniques attempted:  Self-directed dieting: calorie restricting    Pharmacological Therapy  Current Medications: N/a  Clarifications to above regimen: patient stopped Wegovy 6/2025  Adverse Effects: Denies    Insurance coverage of weight-loss medications? Yes, [place] approved through 9/15/2025; about $35/month  Eligible for copay cards/programs? 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  Rates readiness to quit smoking as 8/10.  Concerns about quitting: withdrawals    Quit Plan  Quit Date: unable to assess today    Environment  Remove all cigarettes, ashtrays, and lighters from home, car, and/or workplace  Set a \"No Smoking\" rule for your home  Avoid activities and situations that trigger the urge to smoke    Support System  Healthcare team  1-800-QUIT NOW (826-6369)  [employer] and SmokeFree.gov    Craving Management  Chew gum, have a mint or hard candy, or have a health snack  Take a deep breath through your nose and blow out slowly through your mouth. Repeat 10 times.  Engage in physical activity, such as going for a walk or jog  Do something with a friend or family member    Medication Reconciliation:  No changes made " "today    Drug Interactions  No relevant drug interactions were noted.    Medication System Management  Patient's preferred pharmacy: [facility]  Adherence/Organization: No issues reported  Affordability/Accessibility: No issues reported      Objective   No Known Allergies  Social History     Social History Narrative    Not on file      Medication Review  Current Outpatient Medications   Medication Instructions    amLODIPine (NORVASC) 10 mg, oral, Daily    hydroCHLOROthiazide (HYDRODIURIL) 25 mg, oral, Daily    losartan (COZAAR) 100 mg, oral, Daily    naloxone (NARCAN) 4 mg, nasal, As needed, May repeat every 2-3 minutes if needed, alternating nostrils, until medical assistance becomes available.    nicotine polacrilex (Commit) 4 mg lozenge Dissolve 1 lozenge in the mouth every 1-2 hours as needed for smoking cessation. Max 20 lozenges/day.    oxyCODONE-acetaminophen (Percocet) 7.5-325 mg tablet 1 tablet, oral, Every 12 hours PRN    Wegovy 1 mg, subcutaneous, Weekly      Vitals  BP Readings from Last 2 Encounters:   04/22/25 132/78   10/10/24 (!) 142/92     BMI Readings from Last 1 Encounters:   04/22/25 38.90 kg/m²      Labs  A1C  No results found for: \"HGBA1C\"  BMP  Lab Results   Component Value Date    CALCIUM 9.7 01/28/2024     01/28/2024    K 4.3 01/28/2024    CO2 26 01/28/2024     01/28/2024    BUN 14 01/28/2024    CREATININE 0.96 01/28/2024    EGFR >90 01/28/2024     LFTs  Lab Results   Component Value Date    ALT 35 06/13/2020    AST 31 06/13/2020    ALKPHOS 93 06/13/2020    BILITOT 0.5 06/13/2020     FLP  No results found for: \"TRIG\", \"CHOL\", \"LDLF\", \"LDLCALC\", \"HDL\"  Urine Microalbumin  No results found for: \"MICROALBCREA\"  Weight Management  Wt Readings from Last 3 Encounters:   04/22/25 109 kg (241 lb)   10/10/24 113 kg (249 lb 6.4 oz)   09/10/24 112 kg (248 lb)      There is no height or weight on file to calculate BMI.     Assessment/Plan   Problem List Items Addressed This Visit  "      Morbid obesity with BMI of 40.0-44.9, adult (Multi) - Primary    WEIGHT MANAGEMENT  Patient is not currently on any weight management medication. Patient's current weight reported as 235 lbs. Has lost 25 lbs (~10% of TBW) since starting therapy plan.     Patient states he would like to stay off the medication for a month to see how he does on his own without the medication.    Medication Changes:  CONTINUE  Working on diet/lifestyle              Clinical Pharmacist follow-up: as needed by patient or PCP, Telehealth visit    Continue all meds under the continuation of care with the referring provider and clinical pharmacy team.    Thank you,  Deedee Edgar, PharmD  Clinical Pharmacist  [phone]    Verbal consent to manage patient's drug therapy was obtained from the patient. They were informed they may decline to participate or withdraw from participation in pharmacy services at any time.

## 2025-08-20 ENCOUNTER — TELEPHONE (OUTPATIENT)
Dept: PRIMARY CARE | Facility: CLINIC | Age: 45
End: 2025-08-20
Payer: COMMERCIAL